# Patient Record
Sex: FEMALE | Race: OTHER | Employment: FULL TIME | URBAN - METROPOLITAN AREA
[De-identification: names, ages, dates, MRNs, and addresses within clinical notes are randomized per-mention and may not be internally consistent; named-entity substitution may affect disease eponyms.]

---

## 2016-08-30 LAB
CHLAMYDIA, EXTERNAL: NEGATIVE
N. GONORRHEA, EXTERNAL: NEGATIVE

## 2016-09-20 LAB
ANTIBODY SCREEN, EXTERNAL: NEGATIVE
HBSAG, EXTERNAL: NEGATIVE
HIV, EXTERNAL: NEGATIVE
RPR, EXTERNAL: NONREACTIVE
RUBELLA, EXTERNAL: NORMAL
TYPE, ABO & RH, EXTERNAL: NORMAL

## 2017-02-28 LAB — GRBS, EXTERNAL: NEGATIVE

## 2017-03-23 ENCOUNTER — HOSPITAL ENCOUNTER (EMERGENCY)
Age: 32
Discharge: HOME | End: 2017-03-23
Attending: OBSTETRICS & GYNECOLOGY | Admitting: OBSTETRICS & GYNECOLOGY
Payer: COMMERCIAL

## 2017-03-23 VITALS
TEMPERATURE: 97.2 F | WEIGHT: 198 LBS | HEIGHT: 69 IN | HEART RATE: 81 BPM | BODY MASS INDEX: 29.33 KG/M2 | SYSTOLIC BLOOD PRESSURE: 126 MMHG | DIASTOLIC BLOOD PRESSURE: 74 MMHG | OXYGEN SATURATION: 89 % | RESPIRATION RATE: 16 BRPM

## 2017-03-23 PROCEDURE — 75810000275 HC EMERGENCY DEPT VISIT NO LEVEL OF CARE

## 2017-03-23 PROCEDURE — 99285 EMERGENCY DEPT VISIT HI MDM: CPT | Performed by: OBSTETRICS & GYNECOLOGY

## 2017-03-23 PROCEDURE — 59025 FETAL NON-STRESS TEST: CPT | Performed by: OBSTETRICS & GYNECOLOGY

## 2017-03-23 RX ORDER — VALACYCLOVIR HYDROCHLORIDE 500 MG/1
TABLET, FILM COATED ORAL 2 TIMES DAILY
Status: ON HOLD | COMMUNITY
End: 2019-08-04

## 2017-03-23 RX ORDER — FENTANYL/BUPIVACAINE/NS/PF 2-1250MCG
1-16 PREFILLED PUMP RESERVOIR EPIDURAL CONTINUOUS
Status: DISCONTINUED | OUTPATIENT
Start: 2017-03-23 | End: 2017-03-23 | Stop reason: HOSPADM

## 2017-03-23 RX ORDER — DOCUSATE SODIUM 100 MG/1
100 CAPSULE, LIQUID FILLED ORAL 2 TIMES DAILY
COMMUNITY
End: 2022-04-05

## 2017-03-23 NOTE — IP AVS SNAPSHOT
Shanice Newsome 
 
 
 [address] 
[phone] Patient: Tootie Deleon MRN: [id number]  You are allergic to the following Allergen Reactions Shellfish Derived Anaphylaxis Recent Documentation Height Weight BMI OB Status Smoking Status 1.753 m 89.8 kg 29.24 kg/m2 Pregnant Never Smoker Emergency Contacts Name Discharge Info Relation Home Work Mobile [employer]  Life Partner [7] [phone] About your hospitalization You were admitted on:  N/A You last received care in the:  [facility] LABOR & DELIVERY You were discharged on:  2017 Unit phone number:  [phone] Why you were hospitalized Your primary diagnosis was:  Not on File Providers Seen During Your Hospitalizations Provider Role Specialty Primary office phone Carmelo Terrell MD Attending Provider Obstetrics & Gynecology [phone] Your Primary Care Physician (PCP) Primary Care Physician Office Phone Office Fax NONE ** None ** ** None ** Follow-up Information Follow up With Details Comments Contact Info None   None ([phone]) Patient stated that they have no PCP Current Discharge Medication List  
  
ASK your doctor about these medications Dose & Instructions Dispensing Information Comments Morning Noon Evening Bedtime COLACE 100 mg capsule Generic drug:  docusate sodium Your last dose was: Your next dose is:    
   
   
 Dose:  100 mg Take 100 mg by mouth two (2) times a day. Refills:  0 HYDROcodone-acetaminophen 5-325 mg per tablet Commonly known as:  1463 Franches[address] Parker Your last dose was: Your next dose is:    
   
   
 Dose:  1 Tab Take 1 tablet by mouth every four (4) hours as needed. Quantity:  15 tablet Refills:  0  
     
   
   
   
  
 ibuprofen 800 mg tablet Commonly known as:  MOTRIN Your last dose was: Your next dose is:    
   
   
 Dose:  800 mg Take 1 tablet by mouth every eight (8) hours. Quantity:  30 tablet Refills:  0 VALTREX 500 mg tablet Generic drug:  valACYclovir Your last dose was: Your next dose is: Take  by mouth two (2) times a day. Indications: GENITAL HERPES SIMPLEX, suppression started at 35 weeks Refills:  0 Discharge Instructions Pregnancy Precautions: Care Instructions Your Care Instructions There is no sure way to prevent labor before your due date ( labor) or to prevent most other pregnancy problems. But there are things you can do to increase your chances of a healthy pregnancy. Go to your appointments, follow your doctor's advice, and take good care of yourself. Eat well, and exercise (if your doctor agrees). And make sure to drink plenty of water. Follow-up care is a key part of your treatment and safety. Be sure to make and go to all appointments, and call your doctor if you are having problems. It's also a good idea to know your test results and keep a list of the medicines you take. How can you care for yourself at home? · Make sure you go to your prenatal appointments. At each visit, your doctor will check your blood pressure. Your doctor will also check to see if you have protein in your urine. High blood pressure and protein in urine are signs of preeclampsia. This condition can be dangerous for you and your baby. · Drink plenty of fluids, enough so that your urine is light yellow or clear like water. Dehydration can cause contractions. If you have kidney, heart, or liver disease and have to limit fluids, talk with your doctor before you increase the amount of fluids you drink. · Tell your doctor right away if you notice any symptoms of an infection, such as: ¨ Burning when you urinate. ¨ A foul-smelling discharge from your vagina. ¨ Vaginal itching. ¨ Unexplained fever. ¨ Unusual pain or soreness in your uterus or lower belly. · Eat a balanced diet. Include plenty of foods that are high in calcium and iron. ¨ Foods high in calcium include milk, cheese, yogurt, almonds, and broccoli. ¨ Foods high in iron include red meat, shellfish, poultry, eggs, beans, raisins, whole-grain bread, and leafy green vegetables. · Do not smoke. If you need help quitting, talk to your doctor about stop-smoking programs and medicines. These can increase your chances of quitting for good. · Do not drink alcohol or use illegal drugs. · Follow your doctor's directions about activity. Your doctor will let you know how much, if any, exercise you can do. · Ask your doctor if you can have sex. If you are at risk for early labor, your doctor may ask you to not have sex. · Take care to prevent falls. During pregnancy, your joints are loose, and your balance is off. Sports such as bicycling, skiing, or in-line skating can increase your risk of falling. And don't ride horses or motorcycles, dive, water ski, scuba dive, or parachute jump while you are pregnant. · Avoid getting very hot. Do not use saunas or hot tubs. Avoid staying out in the sun in hot weather for long periods. Take acetaminophen (Tylenol) to lower a high fever. · Do not take any over-the-counter or herbal medicines or supplements without talking to your doctor or pharmacist first. 
When should you call for help? Call 911 anytime you think you may need emergency care. For example, call if: 
· You passed out (lost consciousness). · You have severe vaginal bleeding. · You have severe pain in your belly or pelvis. · You have had fluid gushing or leaking from your vagina and you know or think the umbilical cord is bulging into your vagina.  If this happens, immediately get down on your knees so your rear end (buttocks) is higher than your head. This will decrease the pressure on the cord until help arrives. Call your doctor now or seek immediate medical care if: 
· You have signs of preeclampsia, such as: 
¨ Sudden swelling of your face, hands, or feet. ¨ New vision problems (such as dimness or blurring). ¨ A severe headache. · You have any vaginal bleeding. · You have belly pain or cramping. · You have a fever. · You have had regular contractions (with or without pain) for an hour. This means that you have 8 or more within 1 hour or 4 or more in 20 minutes after you change your position and drink fluids. · You have a sudden release of fluid from your vagina. · You have low back pain or pelvic pressure that does not go away. · You notice that your baby has stopped moving or is moving much less than normal. 
Watch closely for changes in your health, and be sure to contact your doctor if you have any problems. Where can you learn more? Go to http://lulu-vicente.info/. Enter [id number] in the search box to learn more about \"Pregnancy Precautions: Care Instructions. \" Current as of: May 30, 2016 Content Version: 11.1 © 1955-6054 [employer]. Care instructions adapted under license by [employer] (which disclaims liability or warranty for this information). If you have questions about a medical condition or this instruction, always ask your healthcare professional. [address] any warranty or liability for your use of this information. Counting Your Baby's Kicks: Care Instructions Your Care Instructions Counting your baby's kicks is one way your doctor can tell that your baby is healthy. Most womenespecially in a first pregnancyfeel their baby move for the first time between 16 and 22 weeks. The movement may feel like flutters rather than kicks.  Your baby may move more at certain times of the day. When you are active, you may notice less kicking than when you are resting. At your prenatal visits, your doctor will ask whether the baby is active. In your last trimester, your doctor may ask you to count the number of times you feel your baby move. Follow-up care is a key part of your treatment and safety. Be sure to make and go to all appointments, and call your doctor if you are having problems. It's also a good idea to know your test results and keep a list of the medicines you take. How do you count fetal kicks? · A common method of checking your baby's movement is to count the number of kicks or moves you feel in 1 hour. Ten movements (such as kicks, flutters, or rolls) in 1 hour are normal. Some doctors suggest that you count in the morning until you get to 10 movements. Then you can quit for that day and start again the next day. · Pick your baby's most active time of day to count. This may be any time from morning to evening. · If you do not feel 10 movements in an hour, your baby may be sleeping. Wait for the next hour and count again. When should you call for help? Call your doctor now or seek immediate medical care if: 
· You noticed that your baby has stopped moving or is moving much less than normal. 
Watch closely for changes in your health, and be sure to contact your doctor if you have any problems. Where can you learn more? Go to http://lulu-vicente.info/. Enter Q283 in the search box to learn more about \"Counting Your Baby's Kicks: Care Instructions. \" Current as of: May 30, 2016 Content Version: 11.1 © 4539-0518 [employer]. Care instructions adapted under license by [employer] (which disclaims liability or warranty for this information).  If you have questions about a medical condition or this instruction, always ask your healthcare professional. [address] any warranty or liability for your use of this information. Ashia Ismael Contractions: Care Instructions Your Care Instructions [place] Alanis contractions prepare your uterus for labor. Think of them as a \"warm-up\" exercise that your body does. You may begin to feel them between the 28th and 30th weeks of your pregnancy. But they start as early as the 20th week. Hipolito Alanis contractions usually occur more often during the ninth month. They may go away when you are active and return when you rest. These contractions are like mild contractions of true labor, but they occur less often. (You feel fewer than 8 in an hour.) They don't cause your cervix to open. It may be hard for you to tell the difference between Ashia Ismael contractions and true labor, especially in your first pregnancy. Follow-up care is a key part of your treatment and safety. Be sure to make and go to all appointments, and call your doctor if you are having problems. It's also a good idea to know your test results and keep a list of the medicines you take. How can you care for yourself at home? · Try a warm bath to help relieve muscle tension and reduce pain. · Change positions every 30 minutes. Take breaks if you must sit for a long time. Get up and walk around. · Drink plenty of water, enough so that your urine is light yellow or clear like water. · Taking short walks may help you feel better. Your doctor needs to check any contractions that are getting stronger or closer together. Where can you learn more? Go to http://lulu-vicente.info/. Enter [id number] in the search box to learn more about \"[place] Alanis Contractions: Care Instructions. \" Current as of: May 30, 2016 Content Version: 11.1 © 6956-6327 [employer]. Care instructions adapted under license by [employer] (which disclaims liability or warranty for this information).  If you have questions about a medical condition or this instruction, always ask your healthcare professional. [address] any warranty or liability for your use of this information. Follow up with Dr Mychal El tomorrow 3/24. The office will call to schedule this appointment with you. If undelivered, return on 3/27 at [facility] for elective induction. Discharge Orders None Introducing [facility]! [employer] introduces Cor[employer] patient portal. Now you can access parts of your medical record, email your doctor's office, and request medication refills online. 1. In your internet browser, go to [email or web address] 2. Click on the First Time User? Click Here link in the Sign In box. You will see the New Member Sign Up page. 3. Enter your Coridea Access Code exactly as it appears below. You will not need to use this code after youve completed the sign-up process. If you do not sign up before the expiration date, you must request a new code. · Coridea Access Code: [email or web address] Expires: 6/21/2017  9:38 AM 
 
4. Enter the last four digits of your Social Security Number (xxxx) and Date of Birth (mm/dd/yyyy) as indicated and click Submit. You will be taken to the next sign-up page. 5. Create a Coridea ID. This will be your Coridea login ID and cannot be changed, so think of one that is secure and easy to remember. 6. Create a Coridea password. You can change your password at any time. 7. Enter your Password Reset Question and Answer. This can be used at a later time if you forget your password. 8. Enter your e-mail address. You will receive e-mail notification when new information is available in [address] 9. Click Sign Up. You can now view and download portions of your medical record. 10. Click the Download Summary menu link to download a portable copy of your medical information.  
 
If you have questions, please visit the Frequently Asked Questions section of the [email or web address]. Remember, MyChart is NOT to be used for urgent needs. For medical emergencies, dial 911. Now available from your iPhone and Android! General Information Please provide this summary of care documentation to your next provider. Patient Signature:  ____________________________________________________________ Date:  ____________________________________________________________  
  
Sharad Mais Provider Signature:  ____________________________________________________________ Date:  ____________________________________________________________

## 2017-03-23 NOTE — PROGRESS NOTES
arrive to L & D c/o contractions on & off, every 3-5 minutes, denies leaking fluid or bleeding, does report fetal movement, does report having intercourse last night but does report contractions started prior to that, denies any issues this pregnancy, does take valtrex for suppression but has reported no recent outbreaks  0122- PO fluids given  0136- Dr. Lani Bess notified of pt's arrival & status, plan is to recheck in 2 hours, sooner if pain worsens  0250- EFM adjusted, audible 130's   0257- EFM adjusted, audible 130's  0340-Dr. Meredith updated on pt's pain & SVE, will let patient choose if would like to have pain medicine & be discharged or stay longer to be recheck  0345- Pt declines pain medication at this time, would like to be rechecked in a little while  0349- External EFM changed  0512- At bed, FHR [id number],EFM adjusted  4800- Discussed plan of care & fetal tracing with Dr. Lani Bess, pt will stay until someone from the group rounds on her  0700- report given to NICOL Kovacs

## 2017-03-23 NOTE — IP AVS SNAPSHOT
Summary of Care Report The Summary of Care report has been created to help improve care coordination. Users with access to [employer] or [address] (Web-based application) may access additional patient information including the Discharge Summary. If you are not currently a [address] user and need more information, please call the number listed below in the [place] [address] section and ask to be connected with Medical Records. Facility Information Name Address Phone [address] [place] [id number] [phone] Patient Information Patient Name Sex  [facility], April ([id number]) Female 1985 Discharge Information Admitting Provider Service Area Unit Aniket Herrmann MD / [phone] [facility] 2 Labor & Delivery / [phone] Discharge Provider Discharge Date/Time Discharge Disposition Destination (none) 3/23/2017 (Pending) AHR (none) Patient Language Language ENGLISH [13] Non-Hospital Problems as of 3/23/2017  Never Reviewed Class Noted - Resolved Last Modified Active Problems Pregnancy  2014 - Present 2014 Entered by Rosalba Mandel MD  
  
You are allergic to the following Allergen Reactions Shellfish Derived Anaphylaxis Current Discharge Medication List  
  
ASK your doctor about these medications Dose & Instructions Dispensing Information Comments COLACE 100 mg capsule Generic drug:  docusate sodium Dose:  100 mg Take 100 mg by mouth two (2) times a day. Refills:  0 HYDROcodone-acetaminophen 5-325 mg per tablet Commonly known as:  Muriel Quintin Dose:  1 Tab Take 1 tablet by mouth every four (4) hours as needed. Quantity:  15 tablet Refills:  0  
   
 ibuprofen 800 mg tablet Commonly known as:  MOTRIN  
 Dose:  800 mg Take 1 tablet by mouth every eight (8) hours. Quantity:  30 tablet Refills:  0 VALTREX 500 mg tablet Generic drug:  valACYclovir Take  by mouth two (2) times a day. Indications: GENITAL HERPES SIMPLEX, suppression started at 35 weeks Refills:  0 Current Immunizations Name Date Tdap 2014 Follow-up Information Follow up With Details Comments Contact Info None   None (395) Patient stated that they have no PCP Discharge Instructions Pregnancy Precautions: Care Instructions Your Care Instructions There is no sure way to prevent labor before your due date ( labor) or to prevent most other pregnancy problems. But there are things you can do to increase your chances of a healthy pregnancy. Go to your appointments, follow your doctor's advice, and take good care of yourself. Eat well, and exercise (if your doctor agrees). And make sure to drink plenty of water. Follow-up care is a key part of your treatment and safety. Be sure to make and go to all appointments, and call your doctor if you are having problems. It's also a good idea to know your test results and keep a list of the medicines you take. How can you care for yourself at home? · Make sure you go to your prenatal appointments. At each visit, your doctor will check your blood pressure. Your doctor will also check to see if you have protein in your urine. High blood pressure and protein in urine are signs of preeclampsia. This condition can be dangerous for you and your baby. · Drink plenty of fluids, enough so that your urine is light yellow or clear like water. Dehydration can cause contractions. If you have kidney, heart, or liver disease and have to limit fluids, talk with your doctor before you increase the amount of fluids you drink. · Tell your doctor right away if you notice any symptoms of an infection, such as: ¨ Burning when you urinate. ¨ A foul-smelling discharge from your vagina. ¨ Vaginal itching. ¨ Unexplained fever. ¨ Unusual pain or soreness in your uterus or lower belly. · Eat a balanced diet. Include plenty of foods that are high in calcium and iron. ¨ Foods high in calcium include milk, cheese, yogurt, almonds, and broccoli. ¨ Foods high in iron include red meat, shellfish, poultry, eggs, beans, raisins, whole-grain bread, and leafy green vegetables. · Do not smoke. If you need help quitting, talk to your doctor about stop-smoking programs and medicines. These can increase your chances of quitting for good. · Do not drink alcohol or use illegal drugs. · Follow your doctor's directions about activity. Your doctor will let you know how much, if any, exercise you can do. · Ask your doctor if you can have sex. If you are at risk for early labor, your doctor may ask you to not have sex. · Take care to prevent falls. During pregnancy, your joints are loose, and your balance is off. Sports such as bicycling, skiing, or in-line skating can increase your risk of falling. And don't ride horses or motorcycles, dive, water ski, scuba dive, or parachute jump while you are pregnant. · Avoid getting very hot. Do not use saunas or hot tubs. Avoid staying out in the sun in hot weather for long periods. Take acetaminophen (Tylenol) to lower a high fever. · Do not take any over-the-counter or herbal medicines or supplements without talking to your doctor or pharmacist first. 
When should you call for help? Call 911 anytime you think you may need emergency care. For example, call if: 
· You passed out (lost consciousness). · You have severe vaginal bleeding. · You have severe pain in your belly or pelvis. · You have had fluid gushing or leaking from your vagina and you know or think the umbilical cord is bulging into your vagina.  If this happens, immediately get down on your knees so your rear end (buttocks) is higher than your head. This will decrease the pressure on the cord until help arrives. Call your doctor now or seek immediate medical care if: 
· You have signs of preeclampsia, such as: 
¨ Sudden swelling of your face, hands, or feet. ¨ New vision problems (such as dimness or blurring). ¨ A severe headache. · You have any vaginal bleeding. · You have belly pain or cramping. · You have a fever. · You have had regular contractions (with or without pain) for an hour. This means that you have 8 or more within 1 hour or 4 or more in 20 minutes after you change your position and drink fluids. · You have a sudden release of fluid from your vagina. · You have low back pain or pelvic pressure that does not go away. · You notice that your baby has stopped moving or is moving much less than normal. 
Watch closely for changes in your health, and be sure to contact your doctor if you have any problems. Where can you learn more? Go to http://lulu-vicente.info/. Enter [id number] in the search box to learn more about \"Pregnancy Precautions: Care Instructions. \" Current as of: May 30, 2016 Content Version: 11.1 © 3345-2506 UZwan. Care instructions adapted under license by [employer] (which disclaims liability or warranty for this information). If you have questions about a medical condition or this instruction, always ask your healthcare professional. [address] any warranty or liability for your use of this information. Counting Your Baby's Kicks: Care Instructions Your Care Instructions Counting your baby's kicks is one way your doctor can tell that your baby is healthy. Most womenespecially in a first pregnancyfeel their baby move for the first time between 16 and 22 weeks. The movement may feel like flutters rather than kicks.  Your baby may move more at certain times of the day. When you are active, you may notice less kicking than when you are resting. At your prenatal visits, your doctor will ask whether the baby is active. In your last trimester, your doctor may ask you to count the number of times you feel your baby move. Follow-up care is a key part of your treatment and safety. Be sure to make and go to all appointments, and call your doctor if you are having problems. It's also a good idea to know your test results and keep a list of the medicines you take. How do you count fetal kicks? · A common method of checking your baby's movement is to count the number of kicks or moves you feel in 1 hour. Ten movements (such as kicks, flutters, or rolls) in 1 hour are normal. Some doctors suggest that you count in the morning until you get to 10 movements. Then you can quit for that day and start again the next day. · Pick your baby's most active time of day to count. This may be any time from morning to evening. · If you do not feel 10 movements in an hour, your baby may be sleeping. Wait for the next hour and count again. When should you call for help? Call your doctor now or seek immediate medical care if: 
· You noticed that your baby has stopped moving or is moving much less than normal. 
Watch closely for changes in your health, and be sure to contact your doctor if you have any problems. Where can you learn more? Go to http://lulu-vicente.info/. Enter D112 in the search box to learn more about \"Counting Your Baby's Kicks: Care Instructions. \" Current as of: May 30, 2016 Content Version: 11.1 © 6687-6478 [employer]ov[employer]. Care instructions adapted under license by [employer] (which disclaims liability or warranty for this information).  If you have questions about a medical condition or this instruction, always ask your healthcare professional. [address] any warranty or liability for your use of this information. Raad Simeon Contractions: Care Instructions Your Care Instructions Hipolito Alanis contractions prepare your uterus for labor. Think of them as a \"warm-up\" exercise that your body does. You may begin to feel them between the 28th and 30th weeks of your pregnancy. But they start as early as the 20th week. [place] Alanis contractions usually occur more often during the ninth month. They may go away when you are active and return when you rest. These contractions are like mild contractions of true labor, but they occur less often. (You feel fewer than 8 in an hour.) They don't cause your cervix to open. It may be hard for you to tell the difference between Raad Leonia contractions and true labor, especially in your first pregnancy. Follow-up care is a key part of your treatment and safety. Be sure to make and go to all appointments, and call your doctor if you are having problems. It's also a good idea to know your test results and keep a list of the medicines you take. How can you care for yourself at home? · Try a warm bath to help relieve muscle tension and reduce pain. · Change positions every 30 minutes. Take breaks if you must sit for a long time. Get up and walk around. · Drink plenty of water, enough so that your urine is light yellow or clear like water. · Taking short walks may help you feel better. Your doctor needs to check any contractions that are getting stronger or closer together. Where can you learn more? Go to http://lulu-vicente.info/. Enter [id number] in the search box to learn more about \"[place] Alanis Contractions: Care Instructions. \" Current as of: May 30, 2016 Content Version: 11.1 © 0924-3773 Workiva. Care instructions adapted under license by [employer] (which disclaims liability or warranty for this information).  If you have questions about a medical condition or this instruction, always ask your healthcare professional. [place] [address] any warranty or liability for your use of this information. Follow up with Dr Katelin Khan tomorrow 3/24. The office will call to schedule this appointment with you. If undelivered, return on 3/27 at [facility] for elective induction. Chart Review Routing History Recipient Method Report Sent By Ny Dejesus None [address] Mail IP Auto Routed Notes Mariella Hawkins MD [[id number]] 8/20/2014  9:11 AM 08/20/2014 None [address] IP Auto Routed Notes Mariella Hawkins MD [[id number]] 8/20/2014  9:18 PM 08/20/2014 None [address] Mail IP Auto Routed Notes Mariella Hawkins MD [[id number]] 8/22/2014  8:52 AM 08/22/2014

## 2017-03-23 NOTE — PROGRESS NOTES
3/23/2017 0703  Bedside and Verbal shift change report given to Shemar Desir RNC (oncoming nurse) by Sayda Meléndez RN (offgoing nurse). Report included the following information SBAR, Intake/Output, MAR, Recent Results and Med Rec Status. 3/23/2017 7586  Patient placed on wireless fetal monitor. Educating patient on the need for continuous fetal monitoring at this time due to intermittent tracing in the 60 bpm. Patient states she can audibly hear the drop in heart rate. Patient and SO agree to POC. Consents signed. 3/23/2017 7:44 AM  Patient ambulating in hallway with SO, gait steady. 3/23/2017 0820  Patient returned to bed. States her contractions are about the same as before. 3/23/2017 8:33 AM  Patient taken off of wireless EFM and placed back on monitor. Pulse ox applied. 3/23/2017 8:49 AM  Spoke with Dr. Marina Gooden regarding patient contraction pattern, FHR, and cervical exam during the night. Per MD, recheck patient's cervix. He will review the strip and come up with POC. Cervical exam 2-3/thick/-3. Educated patient on the possibility of going home due to minimal cervical change. Patient and SO agree to POC.     3/23/2017 9:34 AM  Dr Marina Gooden at bedside discussing [address]. Patient wishes to go home and follow up tomorrow. Patient elective induction scheduled for Monday March 27. Went over discharge instructions and follow up appointments; patient verbalizes understanding. 3/23/2017 9:58 AM  Patient left in wheelchair with SO and son. Stable condition noted.

## 2017-03-23 NOTE — DISCHARGE INSTRUCTIONS
Pregnancy Precautions: Care Instructions  Your Care Instructions  There is no sure way to prevent labor before your due date ( labor) or to prevent most other pregnancy problems. But there are things you can do to increase your chances of a healthy pregnancy. Go to your appointments, follow your doctor's advice, and take good care of yourself. Eat well, and exercise (if your doctor agrees). And make sure to drink plenty of water. Follow-up care is a key part of your treatment and safety. Be sure to make and go to all appointments, and call your doctor if you are having problems. It's also a good idea to know your test results and keep a list of the medicines you take. How can you care for yourself at home? · Make sure you go to your prenatal appointments. At each visit, your doctor will check your blood pressure. Your doctor will also check to see if you have protein in your urine. High blood pressure and protein in urine are signs of preeclampsia. This condition can be dangerous for you and your baby. · Drink plenty of fluids, enough so that your urine is light yellow or clear like water. Dehydration can cause contractions. If you have kidney, heart, or liver disease and have to limit fluids, talk with your doctor before you increase the amount of fluids you drink. · Tell your doctor right away if you notice any symptoms of an infection, such as:  ¨ Burning when you urinate. ¨ A foul-smelling discharge from your vagina. ¨ Vaginal itching. ¨ Unexplained fever. ¨ Unusual pain or soreness in your uterus or lower belly. · Eat a balanced diet. Include plenty of foods that are high in calcium and iron. ¨ Foods high in calcium include milk, cheese, yogurt, almonds, and broccoli. ¨ Foods high in iron include red meat, shellfish, poultry, eggs, beans, raisins, whole-grain bread, and leafy green vegetables. · Do not smoke.  If you need help quitting, talk to your doctor about stop-smoking programs and medicines. These can increase your chances of quitting for good. · Do not drink alcohol or use illegal drugs. · Follow your doctor's directions about activity. Your doctor will let you know how much, if any, exercise you can do. · Ask your doctor if you can have sex. If you are at risk for early labor, your doctor may ask you to not have sex. · Take care to prevent falls. During pregnancy, your joints are loose, and your balance is off. Sports such as bicycling, skiing, or in-line skating can increase your risk of falling. And don't ride horses or motorcycles, dive, water ski, scuba dive, or parachute jump while you are pregnant. · Avoid getting very hot. Do not use saunas or hot tubs. Avoid staying out in the sun in hot weather for long periods. Take acetaminophen (Tylenol) to lower a high fever. · Do not take any over-the-counter or herbal medicines or supplements without talking to your doctor or pharmacist first.  When should you call for help? Call 911 anytime you think you may need emergency care. For example, call if:  · You passed out (lost consciousness). · You have severe vaginal bleeding. · You have severe pain in your belly or pelvis. · You have had fluid gushing or leaking from your vagina and you know or think the umbilical cord is bulging into your vagina. If this happens, immediately get down on your knees so your rear end (buttocks) is higher than your head. This will decrease the pressure on the cord until help arrives. Call your doctor now or seek immediate medical care if:  · You have signs of preeclampsia, such as:  ¨ Sudden swelling of your face, hands, or feet. ¨ New vision problems (such as dimness or blurring). ¨ A severe headache. · You have any vaginal bleeding. · You have belly pain or cramping. · You have a fever. · You have had regular contractions (with or without pain) for an hour.  This means that you have 8 or more within 1 hour or 4 or more in 20 minutes after you change your position and drink fluids. · You have a sudden release of fluid from your vagina. · You have low back pain or pelvic pressure that does not go away. · You notice that your baby has stopped moving or is moving much less than normal.  Watch closely for changes in your health, and be sure to contact your doctor if you have any problems. Where can you learn more? Go to http://lulu-vicente.info/. Enter [id number] in the search box to learn more about \"Pregnancy Precautions: Care Instructions. \"  Current as of: May 30, 2016  Content Version: 11.1  © 0434-8343 Spor. Care instructions adapted under license by [employer] (which disclaims liability or warranty for this information). If you have questions about a medical condition or this instruction, always ask your healthcare professional. [address] any warranty or liability for your use of this information. Counting Your Baby's Kicks: Care Instructions  Your Care Instructions  Counting your baby's kicks is one way your doctor can tell that your baby is healthy. Most women--especially in a first pregnancy--feel their baby move for the first time between 16 and 22 weeks. The movement may feel like flutters rather than kicks. Your baby may move more at certain times of the day. When you are active, you may notice less kicking than when you are resting. At your prenatal visits, your doctor will ask whether the baby is active. In your last trimester, your doctor may ask you to count the number of times you feel your baby move. Follow-up care is a key part of your treatment and safety. Be sure to make and go to all appointments, and call your doctor if you are having problems. It's also a good idea to know your test results and keep a list of the medicines you take. How do you count fetal kicks?   · A common method of checking your baby's movement is to count the number of kicks or moves you feel in 1 hour. Ten movements (such as kicks, flutters, or rolls) in 1 hour are normal. Some doctors suggest that you count in the morning until you get to 10 movements. Then you can quit for that day and start again the next day. · Pick your baby's most active time of day to count. This may be any time from morning to evening. · If you do not feel 10 movements in an hour, your baby may be sleeping. Wait for the next hour and count again. When should you call for help? Call your doctor now or seek immediate medical care if:  · You noticed that your baby has stopped moving or is moving much less than normal.  Watch closely for changes in your health, and be sure to contact your doctor if you have any problems. Where can you learn more? Go to http://lulu-vicente.info/. Enter G591 in the search box to learn more about \"Counting Your Baby's Kicks: Care Instructions. \"  Current as of: May 30, 2016  Content Version: 11.1  © 6823-8234 [employer]. Care instructions adapted under license by [employer] (which disclaims liability or warranty for this information). If you have questions about a medical condition or this instruction, always ask your healthcare professional. [address] any warranty or liability for your use of this information. Alisa Castelan Contractions: Care Instructions  Your Care Instructions  [place] Alanis contractions prepare your uterus for labor. Think of them as a \"warm-up\" exercise that your body does. You may begin to feel them between the 28th and 30th weeks of your pregnancy. But they start as early as the 20th week. Hipolito Alansi contractions usually occur more often during the ninth month. They may go away when you are active and return when you rest. These contractions are like mild contractions of true labor, but they occur less often.  (You feel fewer than 8 in an hour.) They don't cause your cervix to open.  It may be hard for you to tell the difference between Francisca Evener contractions and true labor, especially in your first pregnancy. Follow-up care is a key part of your treatment and safety. Be sure to make and go to all appointments, and call your doctor if you are having problems. It's also a good idea to know your test results and keep a list of the medicines you take. How can you care for yourself at home? · Try a warm bath to help relieve muscle tension and reduce pain. · Change positions every 30 minutes. Take breaks if you must sit for a long time. Get up and walk around. · Drink plenty of water, enough so that your urine is light yellow or clear like water. · Taking short walks may help you feel better. Your doctor needs to check any contractions that are getting stronger or closer together. Where can you learn more? Go to http://lulu-vicente.info/. Enter [id number] in the search box to learn more about \"[place] Alanis Contractions: Care Instructions. \"  Current as of: May 30, 2016  Content Version: 11.1  © 5104-6351 [employer]. Care instructions adapted under license by [employer] (which disclaims liability or warranty for this information). If you have questions about a medical condition or this instruction, always ask your healthcare professional. [address] any warranty or liability for your use of this information. Follow up with Dr Chavez Heller tomorrow 3/24. The office will call to schedule this appointment with you. If undelivered, return on 3/27 at [facility] for elective induction.

## 2017-03-25 ENCOUNTER — HOSPITAL ENCOUNTER (INPATIENT)
Age: 32
LOS: 3 days | Discharge: HOME | End: 2017-03-28
Attending: OBSTETRICS & GYNECOLOGY | Admitting: OBSTETRICS & GYNECOLOGY
Payer: COMMERCIAL

## 2017-03-25 PROBLEM — Z37.9 NORMAL LABOR: Status: ACTIVE | Noted: 2017-03-25

## 2017-03-25 LAB
ERYTHROCYTE [DISTWIDTH] IN BLOOD BY AUTOMATED COUNT: 14.2 % (ref 11.5–14.5)
HCT VFR BLD AUTO: 31.9 % (ref 35–47)
HGB BLD-MCNC: 10.2 G/DL (ref 11.5–16)
MCH RBC QN AUTO: 25.7 PG (ref 26–34)
MCHC RBC AUTO-ENTMCNC: 32 G/DL (ref 30–36.5)
MCV RBC AUTO: 80.4 FL (ref 80–99)
PLATELET # BLD AUTO: 208 K/UL (ref 150–400)
RBC # BLD AUTO: 3.97 M/UL (ref 3.8–5.2)
WBC # BLD AUTO: 10.9 K/UL (ref 3.6–11)

## 2017-03-25 PROCEDURE — 75410000002 HC LABOR FEE PER 1 HR: Performed by: OBSTETRICS & GYNECOLOGY

## 2017-03-25 PROCEDURE — 85027 COMPLETE CBC AUTOMATED: CPT | Performed by: OBSTETRICS & GYNECOLOGY

## 2017-03-25 PROCEDURE — 76060000078 HC EPIDURAL ANESTHESIA: Performed by: ANESTHESIOLOGY

## 2017-03-25 PROCEDURE — 75410000000 HC DELIVERY VAGINAL/SINGLE: Performed by: OBSTETRICS & GYNECOLOGY

## 2017-03-25 PROCEDURE — 75410000003 HC RECOV DEL/VAG/CSECN EA 0.5 HR: Performed by: OBSTETRICS & GYNECOLOGY

## 2017-03-25 PROCEDURE — 74011250636 HC RX REV CODE- 250/636: Performed by: OBSTETRICS & GYNECOLOGY

## 2017-03-25 PROCEDURE — 65270000029 HC RM PRIVATE

## 2017-03-25 PROCEDURE — 10907ZC DRAINAGE OF AMNIOTIC FLUID, THERAPEUTIC FROM PRODUCTS OF CONCEPTION, VIA NATURAL OR ARTIFICIAL OPENING: ICD-10-PCS | Performed by: OBSTETRICS & GYNECOLOGY

## 2017-03-25 PROCEDURE — 36415 COLL VENOUS BLD VENIPUNCTURE: CPT | Performed by: OBSTETRICS & GYNECOLOGY

## 2017-03-25 RX ORDER — NALOXONE HYDROCHLORIDE 0.4 MG/ML
0.4 INJECTION, SOLUTION INTRAMUSCULAR; INTRAVENOUS; SUBCUTANEOUS AS NEEDED
Status: DISCONTINUED | OUTPATIENT
Start: 2017-03-25 | End: 2017-03-26

## 2017-03-25 RX ORDER — OXYTOCIN IN 5 % DEXTROSE 30/500 ML
0.5 PLASTIC BAG, INJECTION (ML) INTRAVENOUS
Status: DISCONTINUED | OUTPATIENT
Start: 2017-03-25 | End: 2017-03-26

## 2017-03-25 RX ORDER — ACETAMINOPHEN 325 MG/1
650 TABLET ORAL
Status: DISCONTINUED | OUTPATIENT
Start: 2017-03-25 | End: 2017-03-26

## 2017-03-25 RX ORDER — MAG HYDROX/ALUMINUM HYD/SIMETH 200-200-20
30 SUSPENSION, ORAL (FINAL DOSE FORM) ORAL
Status: DISCONTINUED | OUTPATIENT
Start: 2017-03-25 | End: 2017-03-26

## 2017-03-25 RX ORDER — SODIUM CHLORIDE 0.9 % (FLUSH) 0.9 %
5-10 SYRINGE (ML) INJECTION AS NEEDED
Status: DISCONTINUED | OUTPATIENT
Start: 2017-03-25 | End: 2017-03-26

## 2017-03-25 RX ORDER — SODIUM CHLORIDE 0.9 % (FLUSH) 0.9 %
5-10 SYRINGE (ML) INJECTION EVERY 8 HOURS
Status: DISCONTINUED | OUTPATIENT
Start: 2017-03-25 | End: 2017-03-26

## 2017-03-25 RX ORDER — SODIUM CHLORIDE, SODIUM LACTATE, POTASSIUM CHLORIDE, CALCIUM CHLORIDE 600; 310; 30; 20 MG/100ML; MG/100ML; MG/100ML; MG/100ML
125 INJECTION, SOLUTION INTRAVENOUS CONTINUOUS
Status: DISCONTINUED | OUTPATIENT
Start: 2017-03-25 | End: 2017-03-26

## 2017-03-25 RX ORDER — ONDANSETRON 2 MG/ML
4 INJECTION INTRAMUSCULAR; INTRAVENOUS
Status: DISCONTINUED | OUTPATIENT
Start: 2017-03-25 | End: 2017-03-26

## 2017-03-25 RX ORDER — BUTORPHANOL TARTRATE 1 MG/ML
1 INJECTION INTRAMUSCULAR; INTRAVENOUS
Status: DISCONTINUED | OUTPATIENT
Start: 2017-03-25 | End: 2017-03-26

## 2017-03-25 RX ADMIN — BUTORPHANOL TARTRATE 1 MG: 1 INJECTION, SOLUTION INTRAMUSCULAR; INTRAVENOUS at 23:30

## 2017-03-25 RX ADMIN — SODIUM CHLORIDE, SODIUM LACTATE, POTASSIUM CHLORIDE, AND CALCIUM CHLORIDE 125 ML/HR: 600; 310; 30; 20 INJECTION, SOLUTION INTRAVENOUS at 21:55

## 2017-03-25 NOTE — IP AVS SNAPSHOT
Current Discharge Medication List  
  
CONTINUE these medications which have CHANGED Dose & Instructions Dispensing Information Comments Morning Noon Evening Bedtime  
 ibuprofen 600 mg tablet Commonly known as:  MOTRIN What changed:   
- medication strength 
- how much to take - when to take this 
- reasons to take this Your last dose was: Your next dose is:    
   
   
 Dose:  600 mg Take 1 Tab by mouth every six (6) hours as needed for Pain. Quantity:  30 Tab Refills:  0 ASK your doctor about these medications Dose & Instructions Dispensing Information Comments Morning Noon Evening Bedtime COLACE 100 mg capsule Generic drug:  docusate sodium Your last dose was: Your next dose is:    
   
   
 Dose:  100 mg Take 100 mg by mouth two (2) times a day. Refills:  0 HYDROcodone-acetaminophen 5-325 mg per tablet Commonly known as:  Muriel Quintin Your last dose was: Your next dose is:    
   
   
 Dose:  1 Tab Take 1 tablet by mouth every four (4) hours as needed. Quantity:  15 tablet Refills:  0 VALTREX 500 mg tablet Generic drug:  valACYclovir Your last dose was: Your next dose is: Take  by mouth two (2) times a day. Indications: GENITAL HERPES SIMPLEX, suppression started at 35 weeks Refills:  0  
     
   
   
   
  
 ZANTAC 150 mg tablet Generic drug:  raNITIdine Your last dose was: Your next dose is:    
   
   
 Dose:  150 mg Take 150 mg by mouth two (2) times a day. Refills:  0 Where to Get Your Medications Information on where to get these meds will be given to you by the nurse or doctor. ! Ask your nurse or doctor about these medications  
  ibuprofen 600 mg tablet

## 2017-03-25 NOTE — H&P
Obstetrics H&P    Grace Stephens  [id number]  1985    Chief Complaint:  Pregnancy and Contractions    HPI: 36 y.o. female   [id number] with Estimated Date of Delivery: 3/28/17  Pregnancy has been complicated by grandmultiparity. Patient complains of mild abdominal pain and moderate contractions  for 12 hours. Patient denies vaginal bleeding  and vaginal leaking of fluid . The patient was observed overnight. No change to her cervical exam. Her CTXs have spaced. ROS:  Pertinent items are noted in HPI. OB History      Para Term  AB TAB SAB Ectopic Multiple Living    7 5 4       4        Past Medical History:   Diagnosis Date    Abnormal Pap smear     follow up normal    Abnormal Papanicolaou smear of cervix     Asthma     uses inhaler PRN     History reviewed. No pertinent surgical history. Social History     Social History    Marital status: SINGLE     Spouse name: N/A    Number of children: N/A    Years of education: N/A     Occupational History    Not on file. Social History Main Topics    Smoking status: Never Smoker    Smokeless tobacco: Not on file    Alcohol use No    Drug use: No    Sexual activity: Yes     Partners: Male     Birth control/ protection: None     Other Topics Concern    Not on file     Social History Narrative     History reviewed. No pertinent family history. Allergies   Allergen Reactions    Shellfish Derived Anaphylaxis     Prior to Admission Medications   Prescriptions Last Dose Informant Patient Reported? Taking?   docusate sodium (COLACE) 100 mg capsule 3/22/2017 at Unknown time  Yes Yes   Sig: Take 100 mg by mouth two (2) times a day. hydrocodone-acetaminophen (NORCO) 5-325 mg per tablet Not Taking at Unknown time  No No   Sig: Take 1 tablet by mouth every four (4) hours as needed. ibuprofen (MOTRIN) 800 mg tablet Not Taking at Unknown time  No No   Sig: Take 1 tablet by mouth every eight (8) hours.    valACYclovir (VALTREX) 500 mg tablet 3/22/2017 at Unknown time  Yes Yes   Sig: Take  by mouth two (2) times a day. Indications: GENITAL HERPES SIMPLEX, suppression started at 35 weeks      Facility-Administered Medications: None         Vitals:  No data found. No data recorded. I&O:                    Exam:  Patient without distress. Abdomen soft, non-tender               Fundus soft and non tender               Perineum No sign of blood or amniotic fluid               Lower extremities edema No               Cervical Exam:  2 cm dilated    50% effaced    -3 station    Membranes:   Intact    Fetal Heart Rate: Baseline: 150 per minute  Variability: moderate  Accelerations: yes  Decelerations: none  Uterine Activity: Rare         Labs: No results found for this or any previous visit (from the past 24 hour(s)). Assessment and Plan:      1. Reassuring FHT. Patient not in labor. Discharge home.

## 2017-03-25 NOTE — IP AVS SNAPSHOT
Summary of Care Report The Summary of Care report has been created to help improve care coordination. Users with access to [email or web address] or [address] (Web-based application) may access additional patient information including the Discharge Summary. If you are not currently a [address] user and need more information, please call the number listed below in the [place] [address] section and ask to be connected with Medical Records. Facility Information Name Address Phone [address] [place] [id number] [phone] Patient Information Patient Name Sex  [facility] Medico, April ([id number]) Female 1985 Discharge Information Admitting Provider Service Area Unit Ronaldo Mccauley MD / [phone] [place] 3 Mother Infant / [phone] Discharge Provider Discharge Date/Time Discharge Disposition Destination (none) 3/28/2017 (Pending) AHR (none) Patient Language Language ENGLISH [13] Hospital Problems as of 3/28/2017  Never Reviewed Class Noted - Resolved Last Modified POA Active Problems Normal labor  3/25/2017 - Present 3/25/2017 by Ronaldo Mccauley MD Unknown Entered by Ronaldo Mccauley MD  
  
Non-Hospital Problems as of 3/28/2017  Never Reviewed Class Noted - Resolved Last Modified Active Problems Pregnancy  2014 - Present 2014 Entered by Ronaldo Mccauley MD  
  
You are allergic to the following Allergen Reactions Shellfish Derived Anaphylaxis Current Discharge Medication List  
  
CONTINUE these medications which have CHANGED Dose & Instructions Dispensing Information Comments  
 ibuprofen 600 mg tablet Commonly known as:  MOTRIN What changed:   
- medication strength 
- how much to take - when to take this - reasons to take this Dose:  600 mg Take 1 Tab by mouth every six (6) hours as needed for Pain. Quantity:  30 Tab Refills:  0 ASK your doctor about these medications Dose & Instructions Dispensing Information Comments COLACE 100 mg capsule Generic drug:  docusate sodium Dose:  100 mg Take 100 mg by mouth two (2) times a day. Refills:  0 HYDROcodone-acetaminophen 5-325 mg per tablet Commonly known as:  Latanya Santamaria Dose:  1 Tab Take 1 tablet by mouth every four (4) hours as needed. Quantity:  15 tablet Refills:  0 VALTREX 500 mg tablet Generic drug:  valACYclovir Take  by mouth two (2) times a day. Indications: GENITAL HERPES SIMPLEX, suppression started at 35 weeks Refills:  0  
   
 ZANTAC 150 mg tablet Generic drug:  raNITIdine Dose:  150 mg Take 150 mg by mouth two (2) times a day. Refills:  0 Current Immunizations Name Date Tdap 8/22/2014 Surgery Information ID Date/Time Status Primary Surgeon All Procedures Location [id number] 3/26/2017 Complete   [facility] [place] DO NOT [place]EDULE Follow-up Information Follow up With Details Comments Contact Info None   None (395) Patient stated that they have no PCP Discharge Instructions POST DELIVERY DISCHARGE INSTRUCTIONS Name: Allen Corona YOB: 1985 Primary Diagnosis: Active Problems: 
  Normal labor (3/25/2017) General:  
 
Diet/Diet Restrictions: 
Eight 8-ounce glasses of fluid daily (water, juices); avoid excessive caffeine intake. Meals/snacks as desired which are high in fiber and carbohydrates and low in fat and cholesterol. Physical Activity / Restrictions / Safety:  
 
Avoid heavy lifting, no more that 8 lbs. For 2-3 weeks; limit use of stairs to 2 times daily for the first week home. No driving for one week. Avoid intercourse 4-6 weeks, no douching or tampon use.  Check with obstetrician before starting or resuming an exercise program.    
 
 
Discharge Instructions/Special Treatment/Home Care Needs:  
 
Continue prenatal vitamins. Continue to use squirt bottle with warm water on your episiotomy after each bathroom use until bleeding stops. If steri-strips applied to your incision, remove in 7-10 days. Call your doctor for the following:  
 
Fever over 101 degrees by mouth. Vaginal bleeding heavier than a normal menstrual period or clot larger than a golf ball. Red streaks or increased swelling of legs, painful red streaks on your breast. 
Painful urination, constipation and increased pain or swelling or discharge with your incision. If you feel extremely anxious or overwhelmed. If you have thoughts of harming yourself and/or your baby. Pain Management:  
 
Pain Management:  
Take Acetaminophen (Tylenol) or Ibuprofen (Advil, Motrin), as directed for pain. Use a warm Sitz bath 3 times daily to relieve episiotomy or hemorrhoidal discomfort. Heating pad to  incision as needed. For hemorrhoidal discomfort, use Tucks and Anusol cream as needed and directed. Follow-Up Care: These are general instructions for a healthy lifestyle: No smoking/ No tobacco products/ Avoid exposure to second hand smoke Surgeon General's Warning:  Quitting smoking now greatly reduces serious risk to your health. Obesity, smoking, and sedentary lifestyle greatly increases your risk for illness A healthy diet, regular physical exercise & weight monitoring are important for maintaining a healthy lifestyle Recognize signs and symptoms of STROKE: 
 
F-face looks uneven A-arms unable to move or move unevenly S-speech slurred or non-existent T-time-call 911 as soon as signs and symptoms begin-DO NOT go Back to bed or wait to see if you get better-TIME IS BRAIN.  
 
 
 
Signed By: Jasiel Fernando RN Date: 3/28/2017 Time: 9:36 AM 
 
 
 
Chart Review Routing History Recipient Method Report Sent By Ny Dejesus None [address] IP Auto Routed Notes Mariella Hawkins MD [[id number]] 8/20/2014  9:11 AM 08/20/2014 None [address] IP Auto Routed Notes Mariella Hawkins MD [[id number]] 8/20/2014  9:18 PM 08/20/2014 None [address] IP Auto Routed Notes Mariella Hawkins MD [[id number]] 8/22/2014  8:52 AM 08/22/2014

## 2017-03-26 ENCOUNTER — ANESTHESIA (OUTPATIENT)
Dept: LABOR AND DELIVERY | Age: 32
End: 2017-03-26
Payer: COMMERCIAL

## 2017-03-26 ENCOUNTER — ANESTHESIA EVENT (OUTPATIENT)
Dept: LABOR AND DELIVERY | Age: 32
End: 2017-03-26
Payer: COMMERCIAL

## 2017-03-26 PROCEDURE — 77030014125 HC TY EPDRL BBMI -B: Performed by: ANESTHESIOLOGY

## 2017-03-26 PROCEDURE — 74011250637 HC RX REV CODE- 250/637: Performed by: OBSTETRICS & GYNECOLOGY

## 2017-03-26 PROCEDURE — 75410000002 HC LABOR FEE PER 1 HR: Performed by: OBSTETRICS & GYNECOLOGY

## 2017-03-26 PROCEDURE — 77030018749 HC HK AMNIO DISP DERY -A

## 2017-03-26 PROCEDURE — 65270000029 HC RM PRIVATE

## 2017-03-26 PROCEDURE — 77030034850

## 2017-03-26 PROCEDURE — 74011000250 HC RX REV CODE- 250

## 2017-03-26 PROCEDURE — 74011250636 HC RX REV CODE- 250/636: Performed by: OBSTETRICS & GYNECOLOGY

## 2017-03-26 PROCEDURE — 74011250636 HC RX REV CODE- 250/636: Performed by: ANESTHESIOLOGY

## 2017-03-26 PROCEDURE — 77030018846 HC SOL IRR STRL H20 ICUM -A

## 2017-03-26 RX ORDER — ZOLPIDEM TARTRATE 5 MG/1
5 TABLET ORAL
Status: DISCONTINUED | OUTPATIENT
Start: 2017-03-26 | End: 2017-03-28 | Stop reason: HOSPADM

## 2017-03-26 RX ORDER — HYDROCORTISONE ACETATE PRAMOXINE HCL 2.5; 1 G/100G; G/100G
CREAM TOPICAL AS NEEDED
Status: DISCONTINUED | OUTPATIENT
Start: 2017-03-26 | End: 2017-03-28 | Stop reason: HOSPADM

## 2017-03-26 RX ORDER — DIPHENHYDRAMINE HYDROCHLORIDE 50 MG/ML
12.5 INJECTION, SOLUTION INTRAMUSCULAR; INTRAVENOUS
Status: DISCONTINUED | OUTPATIENT
Start: 2017-03-26 | End: 2017-03-28 | Stop reason: HOSPADM

## 2017-03-26 RX ORDER — IBUPROFEN 800 MG/1
800 TABLET ORAL EVERY 8 HOURS
Status: DISCONTINUED | OUTPATIENT
Start: 2017-03-26 | End: 2017-03-28 | Stop reason: HOSPADM

## 2017-03-26 RX ORDER — RANITIDINE 150 MG/1
150 TABLET, FILM COATED ORAL 2 TIMES DAILY
COMMUNITY
End: 2022-04-05

## 2017-03-26 RX ORDER — ACETAMINOPHEN 325 MG/1
650 TABLET ORAL
Status: DISCONTINUED | OUTPATIENT
Start: 2017-03-26 | End: 2017-03-28 | Stop reason: HOSPADM

## 2017-03-26 RX ORDER — FENTANYL/BUPIVACAINE/NS/PF 2-1250MCG
1-16 PREFILLED PUMP RESERVOIR EPIDURAL CONTINUOUS
Status: DISCONTINUED | OUTPATIENT
Start: 2017-03-26 | End: 2017-03-26

## 2017-03-26 RX ORDER — HYDROCODONE BITARTRATE AND ACETAMINOPHEN 5; 325 MG/1; MG/1
1 TABLET ORAL
Status: DISCONTINUED | OUTPATIENT
Start: 2017-03-26 | End: 2017-03-28 | Stop reason: HOSPADM

## 2017-03-26 RX ORDER — ONDANSETRON 2 MG/ML
4 INJECTION INTRAMUSCULAR; INTRAVENOUS
Status: DISCONTINUED | OUTPATIENT
Start: 2017-03-26 | End: 2017-03-28 | Stop reason: HOSPADM

## 2017-03-26 RX ORDER — DOCUSATE SODIUM 100 MG/1
100 CAPSULE, LIQUID FILLED ORAL
Status: DISCONTINUED | OUTPATIENT
Start: 2017-03-26 | End: 2017-03-28 | Stop reason: HOSPADM

## 2017-03-26 RX ORDER — SIMETHICONE 80 MG
80 TABLET,CHEWABLE ORAL
Status: DISCONTINUED | OUTPATIENT
Start: 2017-03-26 | End: 2017-03-28 | Stop reason: HOSPADM

## 2017-03-26 RX ORDER — NALOXONE HYDROCHLORIDE 0.4 MG/ML
0.4 INJECTION, SOLUTION INTRAMUSCULAR; INTRAVENOUS; SUBCUTANEOUS AS NEEDED
Status: DISCONTINUED | OUTPATIENT
Start: 2017-03-26 | End: 2017-03-28 | Stop reason: HOSPADM

## 2017-03-26 RX ORDER — FENTANYL/BUPIVACAINE/NS/PF 2-1250MCG
PREFILLED PUMP RESERVOIR EPIDURAL
Status: DISCONTINUED
Start: 2017-03-26 | End: 2017-03-26

## 2017-03-26 RX ORDER — LIDOCAINE HYDROCHLORIDE AND EPINEPHRINE 20; 5 MG/ML; UG/ML
INJECTION, SOLUTION EPIDURAL; INFILTRATION; INTRACAUDAL; PERINEURAL AS NEEDED
Status: DISCONTINUED | OUTPATIENT
Start: 2017-03-26 | End: 2017-03-26 | Stop reason: HOSPADM

## 2017-03-26 RX ORDER — OXYTOCIN/RINGER'S LACTATE 20/1000 ML
125-500 PLASTIC BAG, INJECTION (ML) INTRAVENOUS ONCE
Status: ACTIVE | OUTPATIENT
Start: 2017-03-26 | End: 2017-03-26

## 2017-03-26 RX ORDER — SWAB
1 SWAB, NON-MEDICATED MISCELLANEOUS DAILY
Status: DISCONTINUED | OUTPATIENT
Start: 2017-03-26 | End: 2017-03-28 | Stop reason: HOSPADM

## 2017-03-26 RX ORDER — BUPIVACAINE HYDROCHLORIDE 2.5 MG/ML
INJECTION, SOLUTION EPIDURAL; INFILTRATION; INTRACAUDAL AS NEEDED
Status: DISCONTINUED | OUTPATIENT
Start: 2017-03-26 | End: 2017-03-26 | Stop reason: HOSPADM

## 2017-03-26 RX ADMIN — SODIUM CHLORIDE, SODIUM LACTATE, POTASSIUM CHLORIDE, AND CALCIUM CHLORIDE 125 ML/HR: 600; 310; 30; 20 INJECTION, SOLUTION INTRAVENOUS at 05:37

## 2017-03-26 RX ADMIN — IBUPROFEN 800 MG: 800 TABLET, FILM COATED ORAL at 09:37

## 2017-03-26 RX ADMIN — LIDOCAINE HYDROCHLORIDE AND EPINEPHRINE 7 ML: 20; 5 INJECTION, SOLUTION EPIDURAL; INFILTRATION; INTRACAUDAL; PERINEURAL at 01:28

## 2017-03-26 RX ADMIN — FENTANYL 0.2 MG/100ML-BUPIV 0.125%-NACL 0.9% EPIDURAL INJ 10 ML/HR: 2/0.125 SOLUTION at 01:21

## 2017-03-26 RX ADMIN — Medication 1 TABLET: at 09:37

## 2017-03-26 RX ADMIN — HYDROCODONE BITARTRATE AND ACETAMINOPHEN 1 TABLET: 5; 325 TABLET ORAL at 16:49

## 2017-03-26 RX ADMIN — HYDROCODONE BITARTRATE AND ACETAMINOPHEN 1 TABLET: 5; 325 TABLET ORAL at 12:19

## 2017-03-26 RX ADMIN — IBUPROFEN 800 MG: 800 TABLET, FILM COATED ORAL at 19:32

## 2017-03-26 RX ADMIN — ONDANSETRON 4 MG: 2 INJECTION INTRAMUSCULAR; INTRAVENOUS at 00:38

## 2017-03-26 RX ADMIN — HYDROCODONE BITARTRATE AND ACETAMINOPHEN 1 TABLET: 5; 325 TABLET ORAL at 21:10

## 2017-03-26 RX ADMIN — BUPIVACAINE HYDROCHLORIDE 10 ML: 2.5 INJECTION, SOLUTION EPIDURAL; INFILTRATION; INTRACAUDAL at 00:57

## 2017-03-26 RX ADMIN — Medication 2 MILLI-UNITS/MIN: at 05:03

## 2017-03-26 NOTE — PROGRESS NOTES
2209: Dr. Marina Gooden at bedside to evaluate patient. 0027: Patient requesting epidural. IV bolus initiated. 1: Dr. Jeffrey Galvin at bedside evaluating patient for epidural    0391: Report given to MOODY Lacy rN

## 2017-03-26 NOTE — DISCHARGE SUMMARY
Patient ID:  April Kat  [id number]  36 y.o.  1985    Admit Date: 3/25/2017    Discharge Date: 3/28/2017     Admitting Physician: Rosalba Mandel MD  Attending Physician: Rosalba Mandel MD    Admission Diagnoses:   1. IUP at 39.4 weeks in active labor  2. Grandmultiparity    Procedures for this admission:       Hospital Course: Uncomplicated    Discharge Diagnoses: Same as above with  producing a viable infant. Information for the patient's :  Marlon Barney [[id number]]   One Minute Apgar: 8 (Filed from Delivery Summary)  Five  Minute Apgar: 9 (Filed from Delivery Summary)  Discharge Disposition:  home    Discharge Condition:  Good    Additional Diagnoses: Grandmultiparity. Maternal Labs:   Lab Results   Component Value Date/Time    HBsAg, External negative 2016    HIV, External negative 2016    Rubella, External immune 2016    RPR, External nonreactive 2016    GrBStrep, External negative 2017       Cord Blood Results:   Information for the patient's :  Marlon Barney [[id number]]     Lab Results   Component Value Date/Time    ABO/Rh(D) O POSITIVE 2017 07:10 AM    CAMPBELL IgG NEG 2017 07:10 AM    Bilirubin if CAMPBELL pos: IF DIRECT DELVIN POSITIVE, BILIRUBIN TO FOLLOW 2017 07:10 AM           History of Present Illness:   OB History      Para Term  AB TAB SAB Ectopic Multiple Living    7 6 5      0 5        Admitted for active labor. Hospital Course:   Patient was admitted as above and delivered via . Please the chart for details. The postpartum course was unremarkable. She was deemed stable for discharge home on day 2.     Follow up with Dr. Yaya Hoffman MD in 6 weeks        Signed:  Yaya Hoffman MD  3/28/2017  5:51 AM

## 2017-03-26 NOTE — PROGRESS NOTES
Labor Progress Note  Patient seen, fetal heart rate and contraction pattern evaluated, patient examined. Feels better with epidural.   Patient Vitals for the past 2 hrs:   BP Temp Pulse Resp SpO2   03/26/17 0109 128/70 - 92 - -   03/26/17 0106 137/69 - 100 - -   03/26/17 0103 123/73 - 83 - 100 %   03/26/17 0059 130/72 98.3 °F (36.8 °C) 85 18 -   03/25/17 2347 - - - - 100 %   03/25/17 2345 119/75 - 87 - -   03/25/17 2317 - - - - 100 %   03/25/17 2316 115/79 - 80 - -       Physical Exam:  Cervical Exam:  4 cm dilated    50% effaced    -3 station    Presenting Part: cephalic  Uterine Activity: Frequency: Every 2 minutes  Fetal Heart Rate: Baseline: 120 per minute  Variability: moderate  Accelerations: no  Decelerations: none    Assessment/Plan:  Reassuring fetal status. Continue plan for vaginal delivery.     Isabel Montes De Oca MD

## 2017-03-26 NOTE — H&P
History & Physical    Name: Chelsea Keane MRN: [id number]  SSN: [id number]    YOB: 1985  Age: 36 y.o. Sex: female        Subjective:     Estimated Date of Delivery: 3/28/17  OB History      Para Term  AB TAB SAB Ectopic Multiple Living    8 4 4   1 2   4          Ms. Francine Menchaca is admitted with pregnancy at 39w4d for active labor. Prenatal course was significant for grand multiparity. Please see prenatal records for details. Past Medical History:   Diagnosis Date    Abnormal Pap smear     follow up normal    Abnormal Papanicolaou smear of cervix     Asthma     uses inhaler PRN     History reviewed. No pertinent surgical history. Social History     Occupational History    Not on file. Social History Main Topics    Smoking status: Never Smoker    Smokeless tobacco: Not on file    Alcohol use No    Drug use: No    Sexual activity: Yes     Partners: Male     Birth control/ protection: None     History reviewed. No pertinent family history. Allergies   Allergen Reactions    Shellfish Derived Anaphylaxis     Prior to Admission medications    Medication Sig Start Date End Date Taking? Authorizing Provider   valACYclovir (VALTREX) 500 mg tablet Take  by mouth two (2) times a day. Indications: GENITAL HERPES SIMPLEX, suppression started at 35 weeks    Historical Provider   docusate sodium (COLACE) 100 mg capsule Take 100 mg by mouth two (2) times a day. Historical Provider   hydrocodone-acetaminophen (NORCO) 5-325 mg per tablet Take 1 tablet by mouth every four (4) hours as needed. 14   Doyle Macias MD   ibuprofen (MOTRIN) 800 mg tablet Take 1 tablet by mouth every eight (8) hours. 14   Doyle Macias MD        Review of Systems: A comprehensive review of systems was negative except for that written in the HPI.     Objective:     Vitals:  Vitals:    177   Weight: 89.8 kg (198 lb)   Height: 5' 9\" (1.753 m)        Physical Exam:  Patient without distress. Abdomen: soft, nontender  Fundus: soft and non tender  Perineum: blood absent, amniotic fluid absent  Cervical Exam: 3 cm dilated    50% effaced    -3 station    Presenting Part: cephalic  Lower Extremities:  - Edema No  Membranes:  Artificial Rupture of Membranes; Amniotic Fluid: large amount of clear fluid  Fetal Heart Rate: Baseline: 130 per minute  Variability: moderate  Accelerations: yes  Decelerations: none  Uterine contractions: regular, every 2 minutes    Prenatal Labs:   Lab Results   Component Value Date/Time    Rubella, External immune 09/20/2016    GrBStrep, External negative 02/28/2017    HBsAg, External negative 09/20/2016    HIV, External negative 09/20/2016    RPR, External nonreactive 09/20/2016    Gonorrhea, External negative 08/30/2016    Chlamydia, External negative 08/30/2016        Assessment/Plan:     Plan: Admit for Reassuring fetal status, Labor  Progressing normally, Continue plan for vaginal delivery. Group B Strep was negative.     Signed By:  Marlena Martinez MD     March 25, 2017

## 2017-03-26 NOTE — PROGRESS NOTES
2127: Patient arrived to Labor and Delivery via wheelchair. States contractions starting this evening denies leaking fluid. + fetal movement. Per Dr. Dominick Montes De Oca patient to be admitted. MD placed orders. IV started.    2200: Report given to Vi Perry

## 2017-03-26 NOTE — PROGRESS NOTES
7:08 AM  Bedside and Verbal shift change report given to Jaqueline Mark RN (oncoming nurse) by Annmarie Bowling RN (offgoing nurse). Report included the following information SBAR, Kardex, Procedure Summary, Intake/Output, MAR, Recent Results and Med Rec Status. Assumed care of the pt.     8:25 AM  Verbal SBAR report off to Cuong Darnell RN.

## 2017-03-26 NOTE — PROGRESS NOTES
0320: Verbal SBAR report received from Nubia Brandt 103: Spoke with Dr. Barron Cruz on the phone. Updated MD on contraction pattern and early/variable decels. MD order for cervical exam and Pitocin to be started if cervix is unchanged. 12: Verbal order from Dr. Barron Cruz to start Pitocin at this time. 3674: Bedside and Verbal shift change report given to Tor Pennington rn (oncoming nurse) by Roselia Ortiz (offgoing nurse). Report included the following information SBAR, Kardex, Intake/Output, MAR and Recent Results.

## 2017-03-26 NOTE — ANESTHESIA PREPROCEDURE EVALUATION
Anesthetic History   No history of anesthetic complications            Review of Systems / Medical History  Patient summary reviewed, nursing notes reviewed and pertinent labs reviewed    Pulmonary            Asthma        Neuro/Psych   Within defined limits           Cardiovascular  Within defined limits                Exercise tolerance: >4 METS     GI/Hepatic/Renal  Within defined limits              Endo/Other  Within defined limits           Other Findings              Physical Exam    Airway  Mallampati: II  TM Distance: 4 - 6 cm  Neck ROM: normal range of motion   Mouth opening: Normal     Cardiovascular    Rhythm: regular  Rate: normal         Dental    Dentition: Upper dentition intact and Lower dentition intact     Pulmonary  Breath sounds clear to auscultation               Abdominal         Other Findings            Anesthetic Plan    ASA: 2  Anesthesia type: epidural          Induction: Intravenous  Anesthetic plan and risks discussed with: Patient

## 2017-03-26 NOTE — L&D DELIVERY NOTE
Delivery Summary    Patient: Harl Boast MRN: [id number]  SSN: [id number]    YOB: 1985  Age: 36 y.o. Sex: female        Labor Events:    Labor: No    Rupture Date: 3/26/2017    Rupture Time: 6:10 AM    Rupture Type AROM    Amniotic Fluid Volume:       Amniotic Fluid Description: Clear       Induction:           Augmentation: Oxytocin;AROM    Labor Complications: None     Additional Complications:        Cervical Ripening:       None      Delivery Events:  Episiotomy: None    Laceration(s): None       Repaired: None     Number of Repair Packets:      Suture Type and Size: None        Estimated Blood Loss (ml): 300        Information for the patient's newbornHank Welsh, Male [[id number]]     Delivery Summary - Baby    Delivery Date: 3/26/2017   Delivery Time: 6:23 AM   Delivery Type: Vaginal, Spontaneous Delivery  Sex:  male  Gestational Age: 38w11d  Delivery Clinician:  Pritesh Vargas  Living?: Yes   Delivery Location: L&D 209           APGARS  One minute Five minutes Ten minutes   Skin Color: 0    1       Heart Rate: 2   2         Reflex Irritability: 2   2         Muscle Tone: 2   2       Respiration: 2   2         Total: 8   9           Presentation: Vertex  Position: Left Occiput Anterior  Resuscitation Method:  Suctioning-bulb; Tactile Stimulation     Meconium Stained: None    Cord Information: 3 Vessels   Complications: Nuchal Cord Without Compressions x1, reduced  Cord Blood Sent?:  Yes    Blood Gases Sent?:  No    Placenta:  Date/Time: 3/26  6:27 AM  Removal: Spontaneous      Appearance: Normal     Unity Measurements:  Birth Weight:      Birth Length:     Head Circumference:       Chest Circumference:      Abdominal Girth:       Other Providers:   Vishal Rodas KIMBERLY;GENIE ROJAS;;;;;; Obstetrician;Primary Nurse;Primary Unity Nurse;Staff Nurse;Neonatologist;Anesthesiologist;Crna;Nurse Practitioner;Midwife;Nursery Nurse

## 2017-03-26 NOTE — LACTATION NOTE
Mother states she BF 2 of her previous 3 children for a \"few weeks\" and stopped because \"I was frustrated with all the feeding and crying\". Mother states baby nursed well following delivery but has been sleepy since. Mom attempted to express drops earlier to baby with no results. Assisted mother in waking, positioning, and latching baby to breast.  Baby very sleepy, latched in biological holding nipple in mouth but no sucking. Assisted mother with expressing drops to baby, mother able to express drops with breast massage. BF basics reviewed with parents. Encouraged mother to continue on expressing drops to baby. Discussed with mother her plan for feeding. Reviewed the benefits of exclusive breast milk feeding during the hospital stay. Informed her of the risks of using formula to supplement in the first few days of life as well as the benefits of successful breast milk feeding; referred her to the Breastfeeding booklet about this information. She acknowledges understanding of information reviewed and states that it is her plan to breastfeed her infant. Will support her choice and offer additional information as needed. Reviewed breastfeeding basics:  How milk is made and normal  breastfeeding behaviors discussed. Supply and demand,  stomach size, early feeding cues, skin to skin bonding with comfortable positioning and baby led latch-on reviewed. How to identify signs of successful breastfeeding sessions reviewed; education on assymetrical latch, signs of effective latching vs shallow, in-effective latching, normal  feeding frequency and duration and expected infant output discussed. Hand Expression Education:  Mom taught how to manually hand express her colostrum. Emphasized the importance of providing infant with valuable colostrum as infant rests skin to skin at breast.  Aware to avoid extended periods of non-feeding.   Aware to offer 10-20+ drops of colostrum every 2-3 hours until infant is latching and nursing effectively. Taught the rationale behind this low tech but highly effective evidence based practice. Pt will successfully establish breastfeeding by feeding in response to early feeding cues   or wake every 3h, will obtain deep latch, and will keep log of feedings/output. Taught to BF at hunger cues and or q 2-3 hrs and to offer 10-20 drops of hand expressed colostrum at any non-feeds. Breast Assessment  Left Breast: Medium  Left Nipple: Everted, Intact  Right Breast: Medium  Right Nipple: Everted, Intact  Breast- Feeding Assessment  Attends Breast-Feeding Classes: No  Breast-Feeding Experience: Yes (BF 2 for her previous 3 for a \"few weeks but got frustrated\")  Breast Trauma/Surgery: No  Type/Quality: Attempted  Lactation Consultant Visits  Breast-Feedings: Attempted breast-feeding  Mother/Infant Observation  Mother Observation: Breast comfortable, Recognizes feeding cues  Infant Observation: Relaxed after feeding, Feeding cues  LATCH Documentation  Latch:  Too sleepy or reluctant, no latch achieved  Audible Swallowing: A few with stimulation (mom expressing drops to baby, 3 drops expressed)  Type of Nipple: Everted (after stimulation) (areola thick)  Comfort (Breast/Nipple): Soft/non-tender  Hold (Positioning): Full assist, teach one side, mother does other, staff holds  [facility] Score: 6

## 2017-03-26 NOTE — PROGRESS NOTES
Labor Progress Note  Patient seen, fetal heart rate and contraction pattern evaluated, patient examined.   Patient Vitals for the past 2 hrs:   BP Temp Pulse Resp SpO2   03/26/17 0449 - - - - 100 %   03/26/17 0444 - - - - 100 %   03/26/17 0442 112/64 - 86 - -   03/26/17 0439 - - - - 100 %   03/26/17 0434 - - - - 100 %   03/26/17 0429 - - - - 100 %   03/26/17 0424 - - - - 100 %   03/26/17 0419 - - - - 100 %   03/26/17 0414 - - - - 100 %   03/26/17 0413 106/73 98.5 °F (36.9 °C) 98 16 -   03/26/17 0409 - - - - 100 %   03/26/17 0404 - - - - 100 %   03/26/17 0359 - - - - 98 %   03/26/17 0354 - - - - 99 %   03/26/17 0349 - - - - 100 %   03/26/17 0344 - - - - 99 %   03/26/17 0343 118/74 - 88 - -   03/26/17 0339 - - - - 99 %   03/26/17 0334 - - - - 99 %   03/26/17 0329 - - - - 99 %   03/26/17 0324 - - - - 99 %   03/26/17 0319 - - - - 100 %   03/26/17 0314 - - - - 100 %   03/26/17 0312 122/69 - 91 - -   03/26/17 0309 - - - - 100 %   03/26/17 0304 - - - - 100 %   03/26/17 0259 - - - - 100 %       Physical Exam:  Cervical Exam:  6 cm dilated    70% effaced    -1 station    Presenting Part: cephalic  Uterine Activity: Frequency: Every 3 minutes  Fetal Heart Rate: Baseline: 125 per minute  Variability: moderate  Accelerations: yes  Decelerations: variable    Assessment/Plan:  Reassuring fetal status, Labor  Not progressing normally  start pitocin augmentation, Continue plan for vaginal delivery    Ricky Olvera MD

## 2017-03-27 PROCEDURE — 65270000029 HC RM PRIVATE

## 2017-03-27 PROCEDURE — 74011250637 HC RX REV CODE- 250/637: Performed by: OBSTETRICS & GYNECOLOGY

## 2017-03-27 RX ADMIN — HYDROCODONE BITARTRATE AND ACETAMINOPHEN 1 TABLET: 5; 325 TABLET ORAL at 14:29

## 2017-03-27 RX ADMIN — IBUPROFEN 800 MG: 800 TABLET, FILM COATED ORAL at 20:24

## 2017-03-27 RX ADMIN — IBUPROFEN 800 MG: 800 TABLET, FILM COATED ORAL at 12:39

## 2017-03-27 RX ADMIN — IBUPROFEN 800 MG: 800 TABLET, FILM COATED ORAL at 03:10

## 2017-03-27 RX ADMIN — HYDROCODONE BITARTRATE AND ACETAMINOPHEN 1 TABLET: 5; 325 TABLET ORAL at 03:10

## 2017-03-27 RX ADMIN — Medication 1 TABLET: at 08:35

## 2017-03-27 RX ADMIN — HYDROCODONE BITARTRATE AND ACETAMINOPHEN 1 TABLET: 5; 325 TABLET ORAL at 08:34

## 2017-03-27 RX ADMIN — HYDROCODONE BITARTRATE AND ACETAMINOPHEN 1 TABLET: 5; 325 TABLET ORAL at 20:24

## 2017-03-27 NOTE — PROGRESS NOTES
Bedside and Verbal shift change report given to Tabatha Zuñiga RN (oncoming nurse) by Tammy Wilhelm RN (offgoing nurse). Report included the following information SBAR, Kardex, Intake/Output and MAR.

## 2017-03-27 NOTE — ROUTINE PROCESS
Bedside and Verbal shift change report given to [address] [place] [address]. RN(oncoming nurse) by Chiquita Arboleda RN (offgoing nurse). Report included the following information SBAR, Kardex, Intake/Output, MAR and Recent Results.

## 2017-03-27 NOTE — LACTATION NOTE
Mother states baby has been latching to left side well but does not latch well to right, assisted mother with positioning and assisted with latch on left side, baby latched well. Also showed mother how to hand express to help get baby more interested. Discussed with mother her plan for feeding. Reviewed the benefits of exclusive breast milk feeding during the hospital stay. Informed her of the risks of using formula to supplement in the first few days of life as well as the benefits of successful breast milk feeding; referred her to the Breastfeeding booklet about this information. She acknowledges understanding of information reviewed and states that it is her plan to breastfeed her infant. Mother does state that she was not successful with breastfeeding her other children, most she only tried for days and they did not latch well so she stopped and switched to to formula quickly. Will support her choice and offer additional information as needed. Reviewed breastfeeding basics:  How milk is made and normal  breastfeeding behaviors discussed. Supply and demand,  stomach size, early feeding cues, skin to skin bonding with comfortable positioning and baby led latch-on reviewed. How to identify signs of successful breastfeeding sessions reviewed; education on assymetrical latch, signs of effective latching vs shallow, in-effective latching, normal  feeding frequency and duration and expected infant output discussed. Normal course of breastfeeding discussed including the AAP's recommendation that children receive exclusive breast milk feedings for the first six months of life with breast milk feedings to continue through the first year of life and/or beyond as complimentary table foods are added. Breastfeeding Booklet and Warm line information provided with discussion.   Discussed typical  weight loss and the importance of pediatrician appointment within 24-48 hours of discharge, at 2 weeks of life and normalcy of requesting pediatric weight checks as needed in between visits. Pt will successfully establish breastfeeding by feeding in response to early feeding cues   or wake every 3h, will obtain deep latch, and will keep log of feedings/output. Taught to BF at hunger cues and or q 2-3 hrs and to offer 10-20 drops of hand expressed colostrum at any non-feeds.       Breast Assessment  Left Breast: Medium  Left Nipple: Everted, Intact  Right Breast: Medium  Right Nipple: Everted, Intact  Breast- Feeding Assessment  Attends Breast-Feeding Classes: No  Breast-Feeding Experience: Yes (mother states only a few days with last 3, changed to formul)  Breast Trauma/Surgery: No  Type/Quality: Good  Lactation Consultant Visits  Breast-Feedings: Good   Mother/Infant Observation  Mother Observation: Alignment, Breast comfortable, Close hold  Infant Observation: Latches nipple and aereolae, Lips flanged, lower, Lips flanged, upper, Opens mouth  LATCH Documentation  Latch: Grasps breast, tongue down, lips flanged, rhythmic sucking  Audible Swallowing: A few with stimulation  Type of Nipple: Everted (after stimulation)  Comfort (Breast/Nipple): Soft/non-tender  Hold (Positioning): Full assist, teach one side, mother does other, staff holds (BN tips shared)  LATCH Score: 8

## 2017-03-27 NOTE — PROGRESS NOTES
Post-Partum Day Number 1 Progress Note    April Kat       Information for the patient's :  Francine Menchaca, Male [[id number]]   Vaginal, Spontaneous Delivery   Patient doing well without significant complaint. Voiding without difficulty, normal lochia. Tolerating diet without nausea or vomiting. Pain controlled with oral medications. Vitals:  Visit Vitals    /78 (BP 1 Location: Right arm, BP Patient Position: At rest)    Pulse 78    Temp 98.3 °F (36.8 °C)    Resp 16    Ht 5' 9\" (1.753 m)    Wt 89.8 kg (198 lb)    SpO2 100%    Breastfeeding Yes    BMI 29.24 kg/m2     Temp (24hrs), Av.3 °F (36.8 °C), Min:98.2 °F (36.8 °C), Max:98.6 °F (37 °C)        Exam:   Patient without distress. FF @ U-2 NT                LE NT w/o edema    Labs:     Lab Results   Component Value Date/Time    WBC 10.9 2017 09:52 PM    WBC 7.9 2014 10:40 AM    WBC 8.9 2009 05:15 AM    HGB 10.2 2017 09:52 PM    HGB 11.5 2014 10:40 AM    HGB 10.5 2009 05:15 AM    HCT 31.9 2017 09:52 PM    HCT 34.6 2014 10:40 AM    HCT 32.0 2009 05:15 AM    PLATELET 456  09:52 PM    PLATELET 946 8042 10:40 AM    PLATELET 070  05:15 AM     Lab Results   Component Value Date/Time    Rubella, External immune 2016    GrBStrep, External negative 2017    HBsAg, External negative 2016    HIV, External negative 2016    RPR, External nonreactive 2016    Gonorrhea, External negative 2016    Chlamydia, External negative 2016         Information for the patient's :  Francine Menchaca, Male [[id number]]   One Minute Apgar: 8 (Filed from Delivery Summary)  Five  Minute Apgar: 9 (Filed from Delivery Summary)        No results found for this or any previous visit (from the past 24 hour(s)). Assessment:   PPD 1 s/p , Doing well and stable  Plan:  1.  Continue routine postpartum care      Jennifer Spaulding, MD  3/27/2017  7:39 AM

## 2017-03-27 NOTE — PROGRESS NOTES
Bedside and Verbal shift change report given to Alli Marquez RN (oncoming nurse) by Estrella De Luna RNC (offgoing nurse).  Report included the following information SBAR, Kardex, Intake/Output, MAR and Recent Results

## 2017-03-28 VITALS
OXYGEN SATURATION: 100 % | RESPIRATION RATE: 16 BRPM | HEART RATE: 84 BPM | WEIGHT: 198 LBS | TEMPERATURE: 98.4 F | HEIGHT: 69 IN | DIASTOLIC BLOOD PRESSURE: 78 MMHG | SYSTOLIC BLOOD PRESSURE: 123 MMHG | BODY MASS INDEX: 29.33 KG/M2

## 2017-03-28 PROCEDURE — 74011250637 HC RX REV CODE- 250/637: Performed by: OBSTETRICS & GYNECOLOGY

## 2017-03-28 RX ORDER — IBUPROFEN 600 MG/1
600 TABLET ORAL
Qty: 30 TAB | Refills: 0 | Status: ON HOLD | OUTPATIENT
Start: 2017-03-28 | End: 2019-08-04

## 2017-03-28 RX ADMIN — HYDROCODONE BITARTRATE AND ACETAMINOPHEN 1 TABLET: 5; 325 TABLET ORAL at 11:10

## 2017-03-28 RX ADMIN — HYDROCODONE BITARTRATE AND ACETAMINOPHEN 1 TABLET: 5; 325 TABLET ORAL at 01:18

## 2017-03-28 RX ADMIN — IBUPROFEN 800 MG: 800 TABLET, FILM COATED ORAL at 06:37

## 2017-03-28 RX ADMIN — Medication 1 TABLET: at 09:26

## 2017-03-28 RX ADMIN — HYDROCODONE BITARTRATE AND ACETAMINOPHEN 1 TABLET: 5; 325 TABLET ORAL at 06:37

## 2017-03-28 NOTE — PROGRESS NOTES
Problem: Lactation Care Plan  Goal: *Infant latching appropriately  Outcome: Resolved/Met Date Met:  17  Pt will successfully establish breastfeeding by feeding in response to infant's early feeding cues and/or to offer breast every 2-3 hours. Ways to obtain a deep latch and seek comfortable positioning shared, aware to keep log of feedings/output. Goal: *Weight loss less than 10% of birth weight  Outcome: Resolved/Met Date Met:  17  Infant weight loss is -6.9%. Reviewed breastfeeding basics:  Supply and demand, breastfeed baby 8-12 times in 24 hr.,  stomach size, early  Feeding cues, skin to skin, positioning and baby led latch-on, assymetrical latch with signs of good, deep latch vs shallow, feeding frequency and duration, and log sheet for tracking infant feedings and output. Breastfeeding Booklet and Warm line information given. Discussed typical  weight loss and the importance of infant weight checks with pediatrician 1-2 post discharge. Mother states baby has a pediatric appointment tomorrow. Problem: Patient Education: Go to Patient Education Activity  Goal: Patient/Family Education  Outcome: Resolved/Met Date Met:  17  Engorgement Care Guidelines given if needed:  Reviewed how milk is made and normal phases of milk production. Taught care of engorged breasts - frequent breastfeeding encouraged, cool packs and motrin as tolerated. Anticipatory guidance shared. Discussed eating a healthy diet. Instructed mother to eat a variety of foods in order to get a well balanced diet. She should consume an extra 500 calories per day (more than her non-pregnant requirement.) These extra calories will help provide energy needed for optimal breast milk production. Mother also encouraged to \"drink to thirst\" and it is recommended that she drink fluids such as water, fruit/vegetable juice.  Nutritious snacks should be available so that she can eat throughout the day to help satisfy her hunger and maintain a good milk supply. Care for sore/tender nipples discussed:  ways to improve positioning and latch practiced and discussed, hand express colostrum after feedings and let air dry, light application of lanolin, hydrogel pads, seek comfortable laid back feeding position, start feedings on least sore side first.     Chart shows numerous feedings, void, stool WNL. Discussed importance of monitoring outputs and feedings on first week of life. Discussed ways to tell if baby is  getting enough breast milk, ie  voids and stools, change in color of stool, and return to birth wt within 2 weeks. Follow up with pediatrician visit for weight check in 1-2 days (per AAP guidelines.)  Encouraged to call Warm Line  [phone] or [facility] at [phone] for any questions/problems that arise. Mother also given breastfeeding support group dates and times for any future needs           Comments:   Pt will successfully establish breastfeeding by feeding in response to early feeding cues   or wake every 3h, will obtain deep latch, and will keep log of feedings/output. Taught to BF at hunger cues and or q 2-3 hrs and to offer 10-20 drops of hand expressed colostrum at any non-feeds. Breast Assessment  Left Breast: Medium (Breasts are full - milk came in today.)  Left Nipple: Everted, Intact  Right Breast: Medium  Right Nipple: Everted, Intact  Breast- Feeding Assessment  Attends Breast-Feeding Classes: No  Breast-Feeding Experience: Yes ( only a few days)  Breast Trauma/Surgery: No  Type/Quality: Good (Mother states baby has been latching on well and breastfeeding well. )  Lactation Consultant Visits  Breast-Feedings:  (Mother and baby for discharge.  Mother states baby last  at 56 for 25 minutes and baby fed well per mother.)  Mother/Infant Observation  Mother Observation: Recognizes feeding cues, Close hold (Per mother)  Infant Observation: Feeding cues, Opens mouth, Rhythmic suck (Per mother)  LATCH Documentation  Latch: Grasps breast, tongue down, lips flanged, rhythmic sucking (Per mother)  Audible Swallowing: A few with stimulation (Per mother)  Type of Nipple: Everted (after stimulation)  Comfort (Breast/Nipple): Filling, red/small blisters/bruises, mild/mod discomfort (Nipples are tender but intact per mother)  Hold (Positioning): No assist from staff, mother able to position/hold infant (Per mother)  LATCH Score: 6  Instructed mother to call [facility] if baby breastfeeds again before discharge.

## 2017-03-28 NOTE — PROGRESS NOTES
Bedside and Verbal shift change report given to HENRIK Chase RN (oncoming nurse) by Ken Watson RN (offgoing nurse). Report included the following information SBAR, Kardex, Intake/Output, MAR and Recent Results.

## 2017-03-28 NOTE — DISCHARGE INSTRUCTIONS
POST DELIVERY DISCHARGE INSTRUCTIONS    Name: Grace Stephens  YOB: 1985  Primary Diagnosis: Active Problems:    Normal labor (3/25/2017)        General:     Diet/Diet Restrictions:  Eight 8-ounce glasses of fluid daily (water, juices); avoid excessive caffeine intake. Meals/snacks as desired which are high in fiber and carbohydrates and low in fat and cholesterol. Physical Activity / Restrictions / Safety:     Avoid heavy lifting, no more that 8 lbs. For 2-3 weeks; limit use of stairs to 2 times daily for the first week home. No driving for one week. Avoid intercourse 4-6 weeks, no douching or tampon use. Check with obstetrician before starting or resuming an exercise program.         Discharge Instructions/Special Treatment/Home Care Needs:     Continue prenatal vitamins. Continue to use squirt bottle with warm water on your episiotomy after each bathroom use until bleeding stops. If steri-strips applied to your incision, remove in 7-10 days. Call your doctor for the following:     Fever over 101 degrees by mouth. Vaginal bleeding heavier than a normal menstrual period or clot larger than a golf ball. Red streaks or increased swelling of legs, painful red streaks on your breast.  Painful urination, constipation and increased pain or swelling or discharge with your incision. If you feel extremely anxious or overwhelmed. If you have thoughts of harming yourself and/or your baby. Pain Management:     Pain Management:   Take Acetaminophen (Tylenol) or Ibuprofen (Advil, Motrin), as directed for pain. Use a warm Sitz bath 3 times daily to relieve episiotomy or hemorrhoidal discomfort. Heating pad to  incision as needed. For hemorrhoidal discomfort, use Tucks and Anusol cream as needed and directed. Follow-Up Care:      These are general instructions for a healthy lifestyle:    No smoking/ No tobacco products/ Avoid exposure to second hand smoke    Surgeon General's Warning: Quitting smoking now greatly reduces serious risk to your health. Obesity, smoking, and sedentary lifestyle greatly increases your risk for illness    A healthy diet, regular physical exercise & weight monitoring are important for maintaining a healthy lifestyle    Recognize signs and symptoms of STROKE:    F-face looks uneven    A-arms unable to move or move unevenly    S-speech slurred or non-existent    T-time-call 911 as soon as signs and symptoms begin-DO NOT go       Back to bed or wait to see if you get better-TIME IS BRAIN.         Signed By: Alex Weems RN                                                                                                   Date: 3/28/2017 Time: 9:36 AM

## 2017-03-28 NOTE — PROGRESS NOTES
Pt was given discharge instructions and verbalized understanding. Pt states that she will make a 6 week follow up appointment. Pt states that she knows when to call the doctor. Pt was discharged in stable condition.

## 2019-01-04 LAB
ANTIBODY SCREEN, EXTERNAL: NEGATIVE
HBSAG, EXTERNAL: NEGATIVE
HIV, EXTERNAL: NEGATIVE
RPR, EXTERNAL: NORMAL
RUBELLA, EXTERNAL: NORMAL

## 2019-01-10 LAB
CHLAMYDIA, EXTERNAL: NEGATIVE
N. GONORRHEA, EXTERNAL: NEGATIVE

## 2019-07-11 LAB — GRBS, EXTERNAL: NEGATIVE

## 2019-08-04 ENCOUNTER — ANESTHESIA (OUTPATIENT)
Dept: LABOR AND DELIVERY | Age: 34
End: 2019-08-04
Payer: COMMERCIAL

## 2019-08-04 ENCOUNTER — ANESTHESIA EVENT (OUTPATIENT)
Dept: LABOR AND DELIVERY | Age: 34
End: 2019-08-04
Payer: COMMERCIAL

## 2019-08-04 ENCOUNTER — HOSPITAL ENCOUNTER (INPATIENT)
Age: 34
LOS: 2 days | Discharge: HOME | End: 2019-08-06
Attending: OBSTETRICS & GYNECOLOGY | Admitting: OBSTETRICS & GYNECOLOGY
Payer: COMMERCIAL

## 2019-08-04 LAB
ABO + RH BLD: NORMAL
BLOOD GROUP ANTIBODIES SERPL: NORMAL
ERYTHROCYTE [DISTWIDTH] IN BLOOD BY AUTOMATED COUNT: 14.3 % (ref 11.5–14.5)
HCT VFR BLD AUTO: 36.1 % (ref 35–47)
HGB BLD-MCNC: 11.7 G/DL (ref 11.5–16)
MCH RBC QN AUTO: 25.7 PG (ref 26–34)
MCHC RBC AUTO-ENTMCNC: 32.4 G/DL (ref 30–36.5)
MCV RBC AUTO: 79.3 FL (ref 80–99)
NRBC # BLD: 0 K/UL (ref 0–0.01)
NRBC BLD-RTO: 0 PER 100 WBC
PLATELET # BLD AUTO: 204 K/UL (ref 150–400)
PMV BLD AUTO: 11.1 FL (ref 8.9–12.9)
RBC # BLD AUTO: 4.55 M/UL (ref 3.8–5.2)
SPECIMEN EXP DATE BLD: NORMAL
WBC # BLD AUTO: 11.9 K/UL (ref 3.6–11)

## 2019-08-04 PROCEDURE — 74011250636 HC RX REV CODE- 250/636: Performed by: OBSTETRICS & GYNECOLOGY

## 2019-08-04 PROCEDURE — 76060000078 HC EPIDURAL ANESTHESIA: Performed by: ANESTHESIOLOGY

## 2019-08-04 PROCEDURE — 36415 COLL VENOUS BLD VENIPUNCTURE: CPT

## 2019-08-04 PROCEDURE — 74011000250 HC RX REV CODE- 250: Performed by: OBSTETRICS & GYNECOLOGY

## 2019-08-04 PROCEDURE — 77030011943

## 2019-08-04 PROCEDURE — 3E0R3BZ INTRODUCTION OF ANESTHETIC AGENT INTO SPINAL CANAL, PERCUTANEOUS APPROACH: ICD-10-PCS | Performed by: ANESTHESIOLOGY

## 2019-08-04 PROCEDURE — 74011000250 HC RX REV CODE- 250: Performed by: ANESTHESIOLOGY

## 2019-08-04 PROCEDURE — 75410000000 HC DELIVERY VAGINAL/SINGLE: Performed by: OBSTETRICS & GYNECOLOGY

## 2019-08-04 PROCEDURE — 10907ZC DRAINAGE OF AMNIOTIC FLUID, THERAPEUTIC FROM PRODUCTS OF CONCEPTION, VIA NATURAL OR ARTIFICIAL OPENING: ICD-10-PCS | Performed by: OBSTETRICS & GYNECOLOGY

## 2019-08-04 PROCEDURE — 77030014125 HC TY EPDRL BBMI -B: Performed by: ANESTHESIOLOGY

## 2019-08-04 PROCEDURE — 75410000003 HC RECOV DEL/VAG/CSECN EA 0.5 HR: Performed by: OBSTETRICS & GYNECOLOGY

## 2019-08-04 PROCEDURE — 65270000029 HC RM PRIVATE

## 2019-08-04 PROCEDURE — 74011000250 HC RX REV CODE- 250

## 2019-08-04 PROCEDURE — 99284 EMERGENCY DEPT VISIT MOD MDM: CPT

## 2019-08-04 PROCEDURE — 86900 BLOOD TYPING SEROLOGIC ABO: CPT

## 2019-08-04 PROCEDURE — 74011250637 HC RX REV CODE- 250/637: Performed by: OBSTETRICS & GYNECOLOGY

## 2019-08-04 PROCEDURE — 75410000002 HC LABOR FEE PER 1 HR: Performed by: OBSTETRICS & GYNECOLOGY

## 2019-08-04 PROCEDURE — 85027 COMPLETE CBC AUTOMATED: CPT

## 2019-08-04 PROCEDURE — 59025 FETAL NON-STRESS TEST: CPT

## 2019-08-04 RX ORDER — ONDANSETRON 2 MG/ML
4 INJECTION INTRAMUSCULAR; INTRAVENOUS
Status: DISCONTINUED | OUTPATIENT
Start: 2019-08-04 | End: 2019-08-05 | Stop reason: HOSPADM

## 2019-08-04 RX ORDER — SWAB
1 SWAB, NON-MEDICATED MISCELLANEOUS DAILY
Status: DISCONTINUED | OUTPATIENT
Start: 2019-08-05 | End: 2019-08-06 | Stop reason: HOSPADM

## 2019-08-04 RX ORDER — OXYTOCIN/RINGER'S LACTATE 20/1000 ML
125-500 PLASTIC BAG, INJECTION (ML) INTRAVENOUS ONCE
Status: COMPLETED | OUTPATIENT
Start: 2019-08-05 | End: 2019-08-04

## 2019-08-04 RX ORDER — CALCIUM CARBONATE 200(500)MG
200 TABLET,CHEWABLE ORAL
Status: DISCONTINUED | OUTPATIENT
Start: 2019-08-04 | End: 2019-08-06 | Stop reason: HOSPADM

## 2019-08-04 RX ORDER — ZOLPIDEM TARTRATE 5 MG/1
5 TABLET ORAL
Status: DISCONTINUED | OUTPATIENT
Start: 2019-08-04 | End: 2019-08-06 | Stop reason: HOSPADM

## 2019-08-04 RX ORDER — MAG HYDROX/ALUMINUM HYD/SIMETH 200-200-20
30 SUSPENSION, ORAL (FINAL DOSE FORM) ORAL
Status: DISCONTINUED | OUTPATIENT
Start: 2019-08-04 | End: 2019-08-05 | Stop reason: HOSPADM

## 2019-08-04 RX ORDER — CALCIUM CARBONATE 200(500)MG
2 TABLET,CHEWABLE ORAL 3 TIMES DAILY
COMMUNITY
End: 2022-04-05

## 2019-08-04 RX ORDER — SIMETHICONE 80 MG
80 TABLET,CHEWABLE ORAL
Status: DISCONTINUED | OUTPATIENT
Start: 2019-08-04 | End: 2019-08-06 | Stop reason: HOSPADM

## 2019-08-04 RX ORDER — FENTANYL/BUPIVACAINE/NS/PF 2-1250MCG
1-16 PREFILLED PUMP RESERVOIR EPIDURAL CONTINUOUS
Status: DISCONTINUED | OUTPATIENT
Start: 2019-08-04 | End: 2019-08-06

## 2019-08-04 RX ORDER — NALOXONE HYDROCHLORIDE 0.4 MG/ML
0.4 INJECTION, SOLUTION INTRAMUSCULAR; INTRAVENOUS; SUBCUTANEOUS AS NEEDED
Status: DISCONTINUED | OUTPATIENT
Start: 2019-08-04 | End: 2019-08-06 | Stop reason: HOSPADM

## 2019-08-04 RX ORDER — SODIUM CHLORIDE, SODIUM LACTATE, POTASSIUM CHLORIDE, CALCIUM CHLORIDE 600; 310; 30; 20 MG/100ML; MG/100ML; MG/100ML; MG/100ML
125 INJECTION, SOLUTION INTRAVENOUS CONTINUOUS
Status: DISCONTINUED | OUTPATIENT
Start: 2019-08-04 | End: 2019-08-06

## 2019-08-04 RX ORDER — NALOXONE HYDROCHLORIDE 0.4 MG/ML
0.4 INJECTION, SOLUTION INTRAMUSCULAR; INTRAVENOUS; SUBCUTANEOUS AS NEEDED
Status: DISCONTINUED | OUTPATIENT
Start: 2019-08-04 | End: 2019-08-05 | Stop reason: HOSPADM

## 2019-08-04 RX ORDER — DOCUSATE SODIUM 100 MG/1
100 CAPSULE, LIQUID FILLED ORAL
Status: DISCONTINUED | OUTPATIENT
Start: 2019-08-04 | End: 2019-08-06 | Stop reason: HOSPADM

## 2019-08-04 RX ORDER — DIPHENHYDRAMINE HCL 25 MG
25 CAPSULE ORAL
Status: DISCONTINUED | OUTPATIENT
Start: 2019-08-04 | End: 2019-08-06 | Stop reason: HOSPADM

## 2019-08-04 RX ORDER — ACETAMINOPHEN 325 MG/1
650 TABLET ORAL
Status: DISCONTINUED | OUTPATIENT
Start: 2019-08-04 | End: 2019-08-06 | Stop reason: HOSPADM

## 2019-08-04 RX ORDER — IBUPROFEN 800 MG/1
800 TABLET ORAL EVERY 8 HOURS
Status: DISCONTINUED | OUTPATIENT
Start: 2019-08-05 | End: 2019-08-06 | Stop reason: HOSPADM

## 2019-08-04 RX ORDER — ACETAMINOPHEN 325 MG/1
650 TABLET ORAL
Status: DISCONTINUED | OUTPATIENT
Start: 2019-08-04 | End: 2019-08-05 | Stop reason: HOSPADM

## 2019-08-04 RX ORDER — SODIUM CHLORIDE 0.9 % (FLUSH) 0.9 %
5-40 SYRINGE (ML) INJECTION EVERY 8 HOURS
Status: DISCONTINUED | OUTPATIENT
Start: 2019-08-04 | End: 2019-08-06

## 2019-08-04 RX ORDER — EPHEDRINE SULFATE/0.9% NACL/PF 50 MG/5 ML
20 SYRINGE (ML) INTRAVENOUS
Status: DISCONTINUED | OUTPATIENT
Start: 2019-08-04 | End: 2019-08-05 | Stop reason: HOSPADM

## 2019-08-04 RX ORDER — BUPIVACAINE HYDROCHLORIDE 2.5 MG/ML
INJECTION, SOLUTION EPIDURAL; INFILTRATION; INTRACAUDAL AS NEEDED
Status: DISCONTINUED | OUTPATIENT
Start: 2019-08-04 | End: 2019-08-04 | Stop reason: HOSPADM

## 2019-08-04 RX ORDER — CALCIUM CARBONATE 200(500)MG
400 TABLET,CHEWABLE ORAL 3 TIMES DAILY
Status: CANCELLED | OUTPATIENT
Start: 2019-08-05

## 2019-08-04 RX ORDER — OXYTOCIN/0.9 % SODIUM CHLORIDE 30/500 ML
0-20 PLASTIC BAG, INJECTION (ML) INTRAVENOUS
Status: DISCONTINUED | OUTPATIENT
Start: 2019-08-04 | End: 2019-08-06

## 2019-08-04 RX ORDER — HYDROCODONE BITARTRATE AND ACETAMINOPHEN 5; 325 MG/1; MG/1
1 TABLET ORAL
Status: DISCONTINUED | OUTPATIENT
Start: 2019-08-04 | End: 2019-08-06 | Stop reason: HOSPADM

## 2019-08-04 RX ORDER — ONDANSETRON 4 MG/1
4 TABLET, ORALLY DISINTEGRATING ORAL
Status: ACTIVE | OUTPATIENT
Start: 2019-08-04 | End: 2019-08-05

## 2019-08-04 RX ORDER — SODIUM CHLORIDE 0.9 % (FLUSH) 0.9 %
5-40 SYRINGE (ML) INJECTION AS NEEDED
Status: DISCONTINUED | OUTPATIENT
Start: 2019-08-04 | End: 2019-08-06 | Stop reason: HOSPADM

## 2019-08-04 RX ORDER — HYDROCORTISONE ACETATE PRAMOXINE HCL 2.5; 1 G/100G; G/100G
CREAM TOPICAL AS NEEDED
Status: DISCONTINUED | OUTPATIENT
Start: 2019-08-04 | End: 2019-08-06 | Stop reason: HOSPADM

## 2019-08-04 RX ORDER — LIDOCAINE HYDROCHLORIDE AND EPINEPHRINE 15; 5 MG/ML; UG/ML
INJECTION, SOLUTION EPIDURAL AS NEEDED
Status: DISCONTINUED | OUTPATIENT
Start: 2019-08-04 | End: 2019-08-04 | Stop reason: HOSPADM

## 2019-08-04 RX ADMIN — BUPIVACAINE HYDROCHLORIDE 10 ML: 2.5 INJECTION, SOLUTION EPIDURAL; INFILTRATION; INTRACAUDAL; PERINEURAL at 16:33

## 2019-08-04 RX ADMIN — ONDANSETRON 4 MG: 2 INJECTION INTRAMUSCULAR; INTRAVENOUS at 19:25

## 2019-08-04 RX ADMIN — FAMOTIDINE 20 MG: 10 INJECTION, SOLUTION INTRAVENOUS at 17:22

## 2019-08-04 RX ADMIN — SODIUM CHLORIDE, SODIUM LACTATE, POTASSIUM CHLORIDE, AND CALCIUM CHLORIDE 999 ML/HR: 600; 310; 30; 20 INJECTION, SOLUTION INTRAVENOUS at 16:24

## 2019-08-04 RX ADMIN — LIDOCAINE HYDROCHLORIDE AND EPINEPHRINE 3 ML: 15; 5 INJECTION, SOLUTION EPIDURAL at 16:34

## 2019-08-04 RX ADMIN — OXYTOCIN-SODIUM CHLORIDE 0.9% IV SOLN 30 UNIT/500ML 999 MILLI-UNITS/MIN: 30-0.9/5 SOLUTION at 22:30

## 2019-08-04 RX ADMIN — Medication 10 ML/HR: at 16:46

## 2019-08-04 RX ADMIN — ANTACID TABLETS 200 MG: 500 TABLET, CHEWABLE ORAL at 17:22

## 2019-08-04 RX ADMIN — SODIUM CHLORIDE, SODIUM LACTATE, POTASSIUM CHLORIDE, AND CALCIUM CHLORIDE 125 ML/HR: 600; 310; 30; 20 INJECTION, SOLUTION INTRAVENOUS at 15:00

## 2019-08-04 RX ADMIN — Medication 10000 MILLI-UNITS/HR: at 23:41

## 2019-08-04 RX ADMIN — IBUPROFEN 800 MG: 800 TABLET ORAL at 23:41

## 2019-08-04 RX ADMIN — OXYTOCIN-SODIUM CHLORIDE 0.9% IV SOLN 30 UNIT/500ML 2 MILLI-UNITS/MIN: 30-0.9/5 SOLUTION at 19:15

## 2019-08-04 NOTE — PROGRESS NOTES
Labor Progress Note  Patient seen, fetal heart rate and contraction pattern evaluated, patient examined. Desires AROM and then epidural placement. Physical Exam:  Cervical Exam:  4 cm dilated  50% effaced -2 station    AROM clear  Presenting Part: cephalic  Uterine Activity: Frequency: Every 2-3 minutes  Fetal Heart Rate: Baseline: 140 per minute  Variability: moderate  Accelerations: yes  Decelerations: none    Assessment/Plan:  Reassuring fetal status, Continue plan for vaginal delivery   AROM now. Epidural bolus.   Cat 1 FHT    Guido Chau MD

## 2019-08-04 NOTE — PROGRESS NOTES
1305: Pt arrived to labor and delivery room 207 reporting contractions every ~3 minutes. Pt notified Dr. Dayami Lee prior to coming. Pt oriented to room and unit and POC discussed, pt verbalizing understanding. 1355: Dr. Dayami Lee at bedside discussing [address] with pt, discussing options depending on cervical exam. Pt states she would like to move forward with labor in hospital if at all possible. 1400: SVE per Dr. Dayami Lee, 4/50/-2. Pt would like to move forward with being admitted. MD discussing POC with pt, pt verbalizing understanding, opportunity for questions provided. VORB for IV placement and fluids, MD to return to bedside in about an hour. 1526: Spoke with Dr. Dayami Lee via phone, MD states he will be at bedside shortly. 1534: Pt states she would like epidural. Fluid bolus started. 1625: Dr. Todd Velazquez at bedside to discuss epidural placement with pt. Pt verbalizing understanding, opportunity for questions provided. Pt positioned for epidural placement. 1510: Spoke with Dr. Dayami Lee via telephone regarding pt complaints of heartburn. TORB for tums prn and 20 mg IV pepcid now. 1640: Epidural placement complete, pt repositioned in bed. Bed pads changed. 1650: Pt complaints of pain with contractions following epidural. Pt given bolus button to utilize now. 1700: Pt states pain is improved during contractions, pt denies any sensations of pressure, pt just states pain during contractions in lower abdomen that subsides between contractions. 1810: This RN reviewing prenatal labs and noted MD prescribed valtrex on 7/25/19, pt states she did not take prescription and that Dr. Dayami Lee was aware she did not  prescription. Dr. Dayami Lee called to make sure MD was aware pt stating she did not take valtrex as prescribed. MD states he is aware and that MD performed visual assessment for lesions when performing AROM and did not note any.      1840: Dr. Dayami Lee at bedside to discuss POC with pt, MD reviewing FHR and toco tracing. SVE per MD, 4/70/-2. MD discussing initiation of pitocin with pt, pt agrees and verbalizing understanding, opportunity for questions provided. VORB for pitocin per protocol. 1845: Bedside and Verbal shift change report given to FRANKO Mobley RN (oncoming nurse) by MIGUEL A Peterson (offgoing nurse). Report included the following information SBAR, Kardex, Procedure Summary, Intake/Output, MAR, Accordion and Recent Results.

## 2019-08-04 NOTE — ROUTINE PROCESS
1310: This RN calling Dr. Aaron Chavarria at this time, MD updated on pt status, SVE of 2-3/50/-3, and contraction pattern. MD VORB NST and possible repeat SVE in next hour. MD to arrive to assess pt in approximately 30 minutes. MD made aware of that pt reports not drinking water today, MD verbalizing agreement with plan for pt to PO hydrate. No further orders received at this time. 1314: Pt provided with pt water jug, pt educated on PO hydration, pt now drinking water at this time 1420: Dr. Aaron Chavarria updated on difficulty with IV placement. MD stating no rush. MD will reassess pt following IV placement 1448: ICU RN at pt bedside at this time to attempt IV placement per this RN request due to 2 failed attempts by this RN and GAVI Karimi, [place].  
 
5385: Dr. Aaron Chavarria at pt bedside at this time discussing plan of care with pt, pt verbalizing understanding to plan of care and plan for MD to perform SVE and AROM at this time 1552: SVE per MD, unchanged cervix of 4/50/-2 noted. AROM by MD, moderate amount of clear - light blood stained fluid noted by MD and this RN. Aurea care performed and pad changed. 5 MD discussing plan of care with pt. Pt epidural fluid bolus already initiated, plan per MD and pt is for pt to receive epidural shortly and MD will be over to reassess pt later this evening.

## 2019-08-04 NOTE — H&P
History & Physical    Name: Miguelito Sheridan MRN: [id number]  SSN: [id number]    YOB: 1985  Age: 30 y.o. Sex: female        Subjective:     Estimated Date of Delivery: 8/15/19  OB History        8    Para   5    Term   5            AB   2    Living   5       SAB   1    TAB        Ectopic        Molar        Multiple   0    Live Births   5                MsLudin Bell is admitted with pregnancy at 38w3d for early labor. Prenatal course was complicated by grand multiparity and pelvic pain. Please see prenatal records for details. Ctxs started last night and continued throughout the day getting stronger. Severe pelvic pain present. Past Medical History:   Diagnosis Date    Abnormal Pap smear     follow up normal    Abnormal Papanicolaou smear of cervix     Asthma     uses inhaler PRN    Genital herpes     Herpes simplex virus (HSV) infection      No past surgical history on file. Social History     Occupational History    Not on file   Tobacco Use    Smoking status: Never Smoker   Substance and Sexual Activity    Alcohol use: No    Drug use: No    Sexual activity: Yes     Partners: Male     Birth control/protection: None     No family history on file. Allergies   Allergen Reactions    Shellfish Derived Anaphylaxis     Prior to Admission medications    Medication Sig Start Date End Date Taking? Authorizing Provider   calcium carbonate (TUMS) 200 mg calcium (500 mg) chew Take 2 Tabs by mouth three (3) times daily. Indications: heartburn   Yes Provider, Historical   raNITIdine (ZANTAC) 150 mg tablet Take 150 mg by mouth two (2) times a day. Yes Provider, Historical   docusate sodium (COLACE) 100 mg capsule Take 100 mg by mouth two (2) times a day. Yes Provider, Historical        Review of Systems: A comprehensive review of systems was negative except for that written in the HPI.     Objective:     Vitals:  Vitals:    19 1314 19 1315 19 1322   BP:  121/74 Pulse:  95    Resp:  16    Temp:  98.1 °F (36.7 °C)    SpO2: 99%     Weight:   94.8 kg (209 lb)   Height:   5' 9\" (1.753 m)      Physical Exam:  Patient without distress. Perineum: blood absent, amniotic fluid absent  Cervical Exam: 4 cm dilated  50% effaced  -2 station    No HSV lesions present  Presenting Part: cephalic  Lower Extremities:  - Edema 1+  Membranes:  Intact  Fetal Heart Rate: Baseline: 140 per minute  Variability: moderate  Accelerations: yes  Decelerations: none  Uterine contractions: regular, every 3 minutes    Prenatal Labs:   Lab Results   Component Value Date/Time    Rubella, External immune 09/20/2016    GrBStrep, External negative 02/28/2017    HBsAg, External negative 09/20/2016    HIV, External negative 09/20/2016    RPR, External nonreactive 09/20/2016    Gonorrhea, External negative 08/30/2016    Chlamydia, External negative 08/30/2016    ABO,Rh O positive 09/20/2016       Assessment/Plan:     Plan: Admit for Labor  Progressing normally. Group B Strep was negative. Will consider AROM after IV is in place. She is not anemic.      Signed By:  Tatyana Poon MD     August 4, 2019

## 2019-08-04 NOTE — PROGRESS NOTES
Labor Progress Note  Patient seen, fetal heart rate and contraction pattern evaluated, patient examined. Comfortable with epidural.    Patient Vitals for the past 2 hrs:   BP Temp Pulse Resp SpO2   08/04/19 1802     100 %   08/04/19 1757     100 %   08/04/19 1752     100 %   08/04/19 1742     100 %   08/04/19 1737     100 %   08/04/19 1736 121/80  71     08/04/19 1732     100 %   08/04/19 1727     100 %   08/04/19 1722     100 %   08/04/19 1720 120/74  77     08/04/19 1717     100 %   08/04/19 1712     100 %   08/04/19 1707 116/65  86  100 %   08/04/19 1705 116/67 98.8 °F (37.1 °C) 86 16    08/04/19 1702     100 %   08/04/19 1657     100 %   08/04/19 1652     100 %   08/04/19 1649 113/72  76     08/04/19 1647     100 %   08/04/19 1646 111/70  68         Physical Exam:  Cervical Exam:  4 cm dilated  70% effaced  -2 station    Presenting Part: cephalic  Uterine Activity: Frequency: Every 3-4 minutes  Fetal Heart Rate: Baseline: 135 per minute  Variability: moderate  Accelerations: yes  Decelerations: none    Assessment/Plan:  Reassuring fetal status, Continue plan for vaginal delivery   Epidural working  Start augmentation with Pitocin.     Tre Sanchez MD

## 2019-08-04 NOTE — ANESTHESIA PROCEDURE NOTES
Epidural Block    Start time: 8/4/2019 4:29 PM  End time: 8/4/2019 4:39 PM  Performed by: Minerva Hooks MD  Authorized by: Minerva Hooks MD     Pre-Procedure  Indication: labor epidural    Preanesthetic Checklist: patient identified, risks and benefits discussed, anesthesia consent, timeout performed and anesthesia consent    Timeout Time: 16:29        Epidural:   Patient position:  Seated  Prep region:  Lumbar  Prep: Betadine    Location:  L3-4    Needle and Epidural Catheter:   Needle Type:  Tuohy  Needle Gauge:  17 G  Injection Technique:  Loss of resistance using air  Attempts:  1  Catheter Size:  18 G  Events: no paresthesia and negative aspiration test    Test Dose:  Lidocaine 1.5% w/ epi and negative    Assessment:   Catheter Secured:  Tegaderm and tape  Insertion:  Uncomplicated  Patient tolerance:  Patient tolerated the procedure well with no immediate complications

## 2019-08-04 NOTE — PROGRESS NOTES
184:Bedside and Verbal shift change report given to FRANKO Kraft RN (oncoming nurse) by MIGUEL A Tee RN (offgoing nurse). Report included the following information SBAR, Kardex, Procedure Summary, Intake/Output and MAR.     : Shift assessment complete. Pt oriented to room and reviewed plan of care. No concerns noted at this time. Pt does state that she is very nauseated and requests meds for the nausea at this time. Pt repositioned on her lateral right with peanut ball. : Pitocin started per protocol. FHR strip reactive. : Zofran 4mg given per order. Pt tolerated well. : Pt repositioned on her lateral left with peanut ball    : Pitocin increased  To a rate of 4 per protocol. : PT repositioned on her lateral right with leg in running man position. : Dr. Dru Gray at bedside assessing pt status and monitoring FHR tracing. SVE preformed by MD. Pt noted to be 6/80/-2. Pad changed. Pt repositioned on her left lateral hip with peanut ball in place. : Pt straight cath using sterile technique. 300ml urine noted. Pt states she is feeling pressure in bottom. : SVE preformed by Sylvia Parker RN. Pt noted to be 10/100/+2    2220: MD called for delivery    222: MD arrived for delivery    2227: Pt placed in lithotomy position with legs in stirrups. PT educated on how to push. MD at perineum. Pushing initiated. 222: MD at perineum.  of head. MD assessing for nuchal. No nuchal noted.  of viable male infant. Infant placed on maternal abdomen. Infant dried and stimulated by Lawrence Yu RN     2230: Cord clamped and cut. Cord blood obtained and sent to lab per protocol. 223:  of intact placenta. Placenta inspected and discarded per policy. MD remains at perineum preforming fundal massage and assessing for lacerations. No lacerations noted. Fundus noted to be firm and -1 umbilicus. Delivery QBL 100ml     2240: Initial fundal assessment preformed.  Fundus noted to be firm. [id number]: Two hr postpartum assessment complete    Total QBL 210ml    0125:TRANSFER - OUT REPORT:    Verbal report given to MOODY Killian and CHRISTIAN Girard RN(name) on April Medlock  being transferred to MIU(unit) for routine progression of care       Report consisted of patients Situation, Background, Assessment and   Recommendations(SBAR). Information from the following report(s) SBAR, Kardex, Procedure Summary, Intake/Output and MAR was reviewed with the receiving nurse. Lines:   Peripheral IV 08/04/19 Anterior;Right Forearm (Active)   Site Assessment Clean, dry, & intact 8/4/2019  7:34 PM   Phlebitis Assessment 0 8/4/2019  7:34 PM   Infiltration Assessment 0 8/4/2019  7:34 PM   Dressing Status Clean, dry, & intact 8/4/2019  7:34 PM   Dressing Type Transparent;Tape 8/4/2019  7:34 PM   Hub Color/Line Status Pink 8/4/2019  7:34 PM   Action Taken Blood drawn 8/4/2019  2:56 PM   Alcohol Cap Used Yes 8/4/2019  2:56 PM        Opportunity for questions and clarification was provided.       Patient transported with:   Registered Nurse

## 2019-08-05 PROCEDURE — 74011250637 HC RX REV CODE- 250/637: Performed by: OBSTETRICS & GYNECOLOGY

## 2019-08-05 PROCEDURE — 65270000029 HC RM PRIVATE

## 2019-08-05 RX ORDER — KETOROLAC TROMETHAMINE 30 MG/ML
30 INJECTION, SOLUTION INTRAMUSCULAR; INTRAVENOUS
Status: DISCONTINUED | OUTPATIENT
Start: 2019-08-05 | End: 2019-08-05

## 2019-08-05 RX ORDER — KETOROLAC TROMETHAMINE 30 MG/ML
30 INJECTION, SOLUTION INTRAMUSCULAR; INTRAVENOUS
Status: DISCONTINUED | OUTPATIENT
Start: 2019-08-05 | End: 2019-08-06 | Stop reason: HOSPADM

## 2019-08-05 RX ADMIN — IBUPROFEN 800 MG: 800 TABLET ORAL at 23:14

## 2019-08-05 RX ADMIN — Medication 1 TABLET: at 09:00

## 2019-08-05 RX ADMIN — ACETAMINOPHEN 650 MG: 325 TABLET ORAL at 22:17

## 2019-08-05 RX ADMIN — IBUPROFEN 800 MG: 800 TABLET ORAL at 15:16

## 2019-08-05 RX ADMIN — IBUPROFEN 800 MG: 800 TABLET ORAL at 07:50

## 2019-08-05 RX ADMIN — DOCUSATE SODIUM 100 MG: 100 CAPSULE, LIQUID FILLED ORAL at 09:00

## 2019-08-05 RX ADMIN — HYDROCODONE BITARTRATE AND ACETAMINOPHEN 1 TABLET: 5; 325 TABLET ORAL at 13:22

## 2019-08-05 NOTE — PROGRESS NOTES
Labor Progress Note  Patient seen, fetal heart rate and contraction pattern evaluated, patient examined. Feels some pressure. Physical Exam:  Cervical Exam:  6 cm dilated  80% effaced  -2 station    Presenting Part: cephalic  Uterine Activity: Frequency: Every 2-3 minutes  Pitocin 4  Fetal Heart Rate: Baseline: 130 per minute  Variability: moderate  Accelerations: yes  Decelerations: early    Assessment/Plan:  Reassuring fetal status, Continue plan for vaginal delivery   Continue augmentation.     Durrell Blizzard, MD

## 2019-08-05 NOTE — L&D DELIVERY NOTE
Delivery Summary    Patient: Miguelito Sheridan MRN: [id number]  SSN: [id number]    YOB: 1985  Age: 30 y.o.   Sex: female        Labor Events:    Labor: No     Steroids: None    Cervical Ripening Date/Time:         Cervical Ripening Type: None    Antibiotics During Labor: No    Rupture Identifier:       Rupture Date/Time: 2019  3:42 PM    Rupture Type: AROM    Amniotic Fluid Volume:       Amniotic Fluid Description: Clear;Blood stained     Amniotic Fluid Odor:       Induction: None         Induction Date/Time:          Indications for Induction:       Augmentation: Oxytocin;AROM    Augmentation Date/Time: 2019 3:40 PM    Indications for Augmentation: Ineffective Contraction Pattern    Labor complications: None         Additional complications:         Delivery Events:  Indications For Episiotomy:      Episiotomy: None    Perineal Laceration(s): None    Repaired:      Periurethral Laceration Location:       Repaired:      Labial Laceration Location:      Repaired:      Sulcal Laceration Location:      Repaired:      Vaginal Laceration Location:      Repaired:      Cervical Laceration Location:      Repaired:      Repair Suture: None    Number of Repair Packets:      Estimated Blood Loss (ml): 100 ml     Information for the patient's newbornLily Marlon Pittman April [[id number]]     Delivery Summary - Baby    Delivery Date: 2019   Delivery Time: 10:28 PM   Delivery Type: Vaginal, Spontaneous  Sex:  male  Gestational Age: 38w3d  Delivery Clinician:  Jennifer Trevizo  Living?: Living   Delivery Location: L&D 207           APGARS  One minute Five minutes Ten minutes   Skin Color: 1    1       Heart Rate: 2   2         Reflex Irritability: 2   2         Muscle Tone: 2   2       Respiration: 2   2         Total: 9   9           Presentation: Vertex  Position: Right Occiput Anterior  Resuscitation Method:  Tactile Stimulation;Suctioning-bulb     Meconium Stained: None    Cord Information: 3 Vessels  Complications: None  Cord around:    Delayed cord clamping? Yes  Cord clamped date/time:2019 10:30 PM  Disposition of Cord Blood: Lab    Blood Gases Sent?: No      Placenta:  Date/Time: 2019 10:32 PM  Removal: Spontaneous      Appearance: Normal     [place] Measurements:  Birth Weight:      Birth Length:     Head Circumference:       Chest Circumference:      Abdominal Girth:       Other Providers:   MIGUEL ÁNGEL CHEN;JOAQUIN WINSTON;JORGE AZAR COURTNEY A Obstetrician;Primary Nurse;Primary [place] Nurse;Charge Nurse       Cord Blood Results:  Information for the patient's :  Stacy Porter, Male April [[id number]]   No results found for: Dairl Room, PCTDIG, BILI, ABORHEXT, ABORH

## 2019-08-05 NOTE — PROGRESS NOTES
Post-Partum Day Number 1 Progress Note    April [facility]   30 y.o. Information for the patient's :  Nick Colbert, Male April [[id number]]   Vaginal, Spontaneous     Patient doing well without significant complaint. Voiding without difficulty, normal lochia. Cramps with breast feeding. Vitals:  Visit Vitals  /76 (BP 1 Location: Right arm, BP Patient Position: At rest)   Pulse 72   Temp 98.4 °F (36.9 °C)   Resp 16   Ht 5' 9\" (1.753 m)   Wt 94.8 kg (209 lb)   SpO2 100%   Breastfeeding? Unknown   BMI 30.86 kg/m²     Temp (24hrs), Av.1 °F (36.7 °C), Min:97.5 °F (36.4 °C), Max:98.8 °F (37.1 °C)    Exam:   Patient without distress. FF @ U-2 NT                LE NT w/o edema    Labs:     Lab Results   Component Value Date/Time    WBC 11.9 (H) 2019 02:40 PM    HGB 11.7 2019 02:40 PM    HCT 36.1 2019 02:40 PM    PLATELET 227  02:40 PM       Recent Results (from the past 24 hour(s))   CBC W/O DIFF    Collection Time: 19  2:40 PM   Result Value Ref Range    WBC 11.9 (H) 3.6 - 11.0 K/uL    RBC 4.55 3.80 - 5.20 M/uL    HGB 11.7 11.5 - 16.0 g/dL    HCT 36.1 35.0 - 47.0 %    MCV 79.3 (L) 80.0 - 99.0 FL    MCH 25.7 (L) 26.0 - 34.0 PG    MCHC 32.4 30.0 - 36.5 g/dL    RDW 14.3 11.5 - 14.5 %    PLATELET 335 426 - 080 K/uL    MPV 11.1 8.9 - 12.9 FL    NRBC 0.0 0  WBC    ABSOLUTE NRBC 0.00 0.00 - 0.01 K/uL   TYPE & SCREEN    Collection Time: 19  2:48 PM   Result Value Ref Range    Crossmatch Expiration 2019     ABO/Rh(D) O POSITIVE     Antibody screen NEG          Assessment: PPD 1 s/p , stable; O+/RI  Plan:  1. Continue routine postpartum care  2.  Circ today      Toño Brunner MD  2019

## 2019-08-05 NOTE — DISCHARGE SUMMARY
Patient ID:  Arlet Erickson  [id number]  30 y.o.  1985    Admit Date: 2019    Discharge Date: 2019     Admitting Physician: Alana Hewitt MD  Attending Physician: Sinan Srinivasan MD    Admission Diagnoses: 1. [address] multiparity in labor and delivery [O09.40]  2. Normal labor [O80, Z37.9]    Procedures for this admission:     Hospital Course: Uncomplicated    Discharge Diagnoses: Same as above with  producing a viable infant. Information for the patient's :  Marlon Larsen April [[id number]]         Discharge Disposition:  Home    Discharge Condition:  Good    Additional Diagnoses: [address] multiparity . Maternal Labs:   Lab Results   Component Value Date/Time    HBsAg, External Negative 2019    HIV, External Negative 2019    Rubella, External Immune 2019    RPR, External Non-Reactive 2019    GrBStrep, External Negative 2019       Cord Blood Results:   Information for the patient's :  Marlon Larsen April [[id number]]   No results found for: PCTABR, ABORH, PCTDIG, BILI, ABORH, ABORHEXT          History of Present Illness:   OB History        8    Para   5    Term   5            AB   2    Living   5       SAB   1    TAB        Ectopic        Molar        Multiple   0    Live Births   5              Admitted for active labor. Hospital Course:   Patient was admitted as above and delivered via . Please the chart for details. The postpartum course was unremarkable. She was deemed stable for discharge home on day 2.     Follow up with Dr. Alana Hewitt MD in 6 weeks        Signed:  Alana Hewitt MD  2019  10:50 PM

## 2019-08-05 NOTE — PROGRESS NOTES
08/05/19 3:18 PM  CM met with CHARBEL to complete initial assessment and to begin discharge planning. Demographics were reviewed and confirmed. CHARBEL and her /FOB Carlos Marr ([phone]) live together with their children, ages 3, 3, 8, 6, and 12. Both work and will have adequate time away from work. Supports available to assist as needed and include family and friends. CHARBEL is breastfeeding and has a pump to use at home. [address] [place] [address] will provide medical follow up for the baby. Patient has car seat, crib, clothing, and other necessary supplies. Denied need for Medicaid services and WIC at this time, but should this change, CHARBEL confirmed that she knows how to access these services. Care Management Interventions  PCP Verified by CM:  Yes  Mode of Transport at Discharge: Self  Transition of Care Consult (CM Consult): Discharge Planning  Current Support Network: Lives with Spouse, Family Lives Nearby  Confirm Follow Up Transport: Family  Plan discussed with Pt/Family/Caregiver: Yes  Discharge Location  Discharge Placement: Home with family assistance  MONTEZ Riggs

## 2019-08-05 NOTE — LACTATION NOTE
This note was copied from a baby's chart. Assisted mother with BF on right breast.  Mother states she has difficulty with latching on the right side. Baby drowsy, woken to feed. Baby will open and hold nipple in mouth, no such achieved. Mother able to express drops to baby. BF basics reviewed. Discussed with mother her plan for feeding. Reviewed the benefits of exclusive breast milk feeding during the hospital stay. Informed her of the risks of using formula to supplement in the first few days of life as well as the benefits of successful breast milk feeding; referred her to the Breastfeeding booklet about this information. She acknowledges understanding of information reviewed and states that it is her plan to breastfeed her infant. Will support her choice and offer additional information as needed. Reviewed breastfeeding basics:  How milk is made and normal  breastfeeding behaviors discussed. Supply and demand,  stomach size, early feeding cues, skin to skin bonding with comfortable positioning and baby led latch-on reviewed. How to identify signs of successful breastfeeding sessions reviewed; education on assymetrical latch, signs of effective latching vs shallow, in-effective latching, normal  feeding frequency and duration and expected infant output discussed. Normal course of breastfeeding discussed including the AAP's recommendation that children receive exclusive breast milk feedings for the first six months of life with breast milk feedings to continue through the first year of life and/or beyond as complimentary table foods are added. Breastfeeding Booklet and Warm line information provided with discussion. Discussed typical  weight loss and the importance of pediatrician appointment within 24-48 hours of discharge, at 2 weeks of life and normalcy of requesting pediatric weight checks as needed in between visits.     Hand Expression Education:  Mom taught how to manually hand express her colostrum. Emphasized the importance of providing infant with valuable colostrum as infant rests skin to skin at breast.  Aware to avoid extended periods of non-feeding. Aware to offer 10-20+ drops of colostrum every 2-3 hours until infant is latching and nursing effectively. Taught the rationale behind this low tech but highly effective evidence based practice. Pt will successfully establish breastfeeding by feeding in response to early feeding cues or wake every 3h, will obtain deep latch, and will keep log of feedings/output. Taught to BF at hunger cues and or q 2-3 hrs and to offer 10-20 drops of hand expressed colostrum at any non-feeds. Breast Assessment  Left Breast: Medium  Left Nipple: Everted, Intact  Right Breast: Medium  Right Nipple: Everted, Intact  Breast- Feeding Assessment  Attends Breast-Feeding Classes: No  Breast-Feeding Experience: Yes(6th child but 2nd to breastfeed.   BF for 7 months)  Breast Trauma/Surgery: No  Type/Quality: Good  Lactation Consultant Visits  Breast-Feedings: Good   Mother/Infant Observation  Mother Observation: Breast comfortable, Recognizes feeding cues  Infant Observation: Rhythmic suck, Relaxed after feeding, Opens mouth, Lips flanged, upper, Lips flanged, lower, Latches nipple and aereolae, Feeding cues  LATCH Documentation  Latch: Grasps breast, tongue down, lips flanged, rhythmic sucking  Audible Swallowing: A few with stimulation  Type of Nipple: Everted (after stimulation)  Comfort (Breast/Nipple): Soft/non-tender  Hold (Positioning): Full assist, teach one side, mother does other, staff holds  [facility] Score: 8

## 2019-08-05 NOTE — PROGRESS NOTES
Bedside and Verbal shift change report given to MOODY Pelletier RN & CHRISTIAN Carlton RN (oncoming nurse) by Keyla Bradley RN & KATHERIN Marinelli RN (offgoing nurse). Report included the following information SBAR, Kardex, Intake/Output and MAR.

## 2019-08-06 VITALS
RESPIRATION RATE: 16 BRPM | DIASTOLIC BLOOD PRESSURE: 75 MMHG | SYSTOLIC BLOOD PRESSURE: 126 MMHG | HEART RATE: 74 BPM | OXYGEN SATURATION: 100 % | WEIGHT: 209 LBS | TEMPERATURE: 98.1 F | BODY MASS INDEX: 30.96 KG/M2 | HEIGHT: 69 IN

## 2019-08-06 PROCEDURE — 74011250637 HC RX REV CODE- 250/637: Performed by: OBSTETRICS & GYNECOLOGY

## 2019-08-06 RX ORDER — IBUPROFEN 800 MG/1
800 TABLET ORAL EVERY 8 HOURS
Qty: 40 TAB | Refills: 0 | Status: SHIPPED | OUTPATIENT
Start: 2019-08-06 | End: 2022-04-05

## 2019-08-06 RX ADMIN — DOCUSATE SODIUM 100 MG: 100 CAPSULE, LIQUID FILLED ORAL at 09:18

## 2019-08-06 RX ADMIN — IBUPROFEN 800 MG: 800 TABLET ORAL at 07:19

## 2019-08-06 RX ADMIN — ACETAMINOPHEN 650 MG: 325 TABLET ORAL at 10:44

## 2019-08-06 RX ADMIN — Medication 1 TABLET: at 09:18

## 2019-08-06 NOTE — PROGRESS NOTES
[facility]  [address]  [facility] [address]  Phone: [phone]           Patient Discharge Instructions     Our goal is to set you up for success. This packet includes information on your condition, medications, and your home care. It will help you to care for yourself so you don't get sicker and need to go back to the hospital.     Please ask your nurse if you have any questions.        There are many details to remember when preparing to leave the hospital. Here is what you will need to do:    1. Take your medicine. If you are prescribed medications, review your Medication List in the following pages. You may have new medications to  at the pharmacy and others that you'll need to stop taking. Review the instructions for how and when to take your medications. Talk with your doctor or nurses if you are unsure of what to do.     2. Go to your follow-up appointments. Specific follow-up information is listed in the following pages. Your may be contacted by a transition nurse or clinical provider about future appointments. Be sure we have all of the phone numbers to reach you, if needed. Please contact your provider's office if you are unable to make an appointment.     3. Watch for warning signs. Your doctor or nurse will give you detailed warning signs to watch for and when to call for assistance. These instructions may also include educational information about your condition. If you experience any of warning signs to your health, call your doctor.               [facility]  Unless otherwise directed by your provider, please contact [facility], our nurse care line that is available for 24/7 assistance.     [phone] (toll-free)    Registered nurses in the [facility] provide clinical advisement, health education, appointment booking, and other advisory services.                    ** Verify the list of medication(s) below is accurate and up  Post-Partum Day Number 2 Progress Note    Patient doing well post-partum without significant complaints. Voiding without difficulty, normal lochia. Cramps are tolerable with Motrin and Tylenol. Breast feeding. Vitals:    Patient Vitals for the past 24 hrs:   BP Temp Pulse Resp   19 0724 (!) 128/93 98.1 °F (36.7 °C) 74 16   19 2252 119/65 98.8 °F (37.1 °C) 79 16   19 1650 120/78 98.4 °F (36.9 °C) 80 16     Temp (24hrs), Av.4 °F (36.9 °C), Min:98.1 °F (36.7 °C), Max:98.8 °F (37.1 °C)      Vital signs stable, afebrile. Exam:  Patient without distress. Abdomen soft, fundus firm, nontender               Lower extremities, ADELAIDA nontender w/o edema    Labs: No results found for this or any previous visit (from the past 24 hour(s)). Assessment and Plan:  PPD 2, stable, routine care; O+/RI  1. Discharge today-instructions reviewed. to date. Carry this with you in case of emergency. If your medications have changed, please notify your healthcare provider.             Medication List      START taking these medications        Additional Info                      ferrous sulfate 325 (65 FE) MG EC tablet   Refills:  0   Dose:  325 mg    Last time this was given:  325 mg on 2/16/2017  1:12 PM   Instructions:  Take 1 tablet (325 mg total) by mouth once daily.     Begin Date    AM    Noon    PM    Bedtime         CONTINUE taking these medications        Additional Info                      medroxyPROGESTERone 150 mg/mL Syrg   Commonly known as:  DEPO-PROVERA   Quantity:  1 mL   Refills:  0   Dose:  150 mg    Instructions:  Inject 1 mL (150 mg total) into the muscle once.     Begin Date    AM    Noon    PM    Bedtime       naproxen sodium 550 MG tablet   Commonly known as:  ANAPROX DS   Quantity:  20 tablet   Refills:  0    Instructions:  DO NOT TAKE OTHER ANTI-INFLAMMATORY AGENTS SUCH AS ASPIRIN, IBUPROPHEN, PAMPRIN, ETC. AT THE SAME TIME     Begin Date    AM    Noon    PM    Bedtime            Where to Get Your Medications      You can get these medications from any pharmacy     You don't need a prescription for these medications     ferrous sulfate 325 (65 FE) MG EC tablet                  Please bring to all follow up appointments:    1. A copy of your discharge instructions.  2. All medicines you are currently taking in their original bottles.  3. Identification and insurance card.    Please arrive 15 minutes ahead of scheduled appointment time.    Please call 24 hours in advance if you must reschedule your appointment and/or time.        Your Scheduled Appointments     Feb 24, 2017  9:00 AM CST   Consult with Lisa Aguayo MD   [facility] - Cardiology ([facility] )    [address]  [place] [address] [phone]              Follow-up Information     Follow up with Lisa Aguayo MD On 2/24/2017.    Specialty:  Cardiology  "   Why:  09:00 appointment    Contact information:    [address]  [facility] [address]  [phone]          Discharge Instructions     Future Orders    Activity as tolerated     Call MD for:  persistent dizziness, light-headedness, or visual disturbances     Call MD for:  persistent nausea and vomiting or diarrhea     Call MD for:  severe persistent headache     Call MD for:  severe uncontrolled pain     Call MD for:  temperature >100.4     Diet general     Questions:    Total calories:      Fat restriction, if any:      Protein restriction, if any:      Na restriction, if any:      Fluid restriction:      Additional restrictions:          Primary Diagnosis     Your primary diagnosis was:  Chest Pain On Exertion      Admission Information     Date & Time Provider Department CSN    2/15/2017  6:46 PM Soco Lopez MD [facility] [id number]      Care Providers     Provider Role Specialty Primary office phone    Soco Lopez MD Attending Provider Hospitalist [phone]    Alix Bennett MD Consulting Physician  Cardiology  [phone]    Soco Lopez MD Team Attending  Hospitalist [phone]    Pantera Barron MD Team Attending  Hospitalist [phone]      Your Vitals Were     BP Pulse Temp Resp Height Weight    121/68 79 97.5 °F (36.4 °C) 20 5' 3" (1.6 m) 55.3 kg (122 lb)    SpO2 BMI             95% 21.61 kg/m2         Recent Lab Values     No lab values to display.      Allergies as of 2/16/2017        Reactions    Bananas [Banana] Itching    Peanut Butter Flavor       Advance Directives     An advance directive is a document which, in the event you are no longer able to make decisions for yourself, tells your healthcare team what kind of treatment you do or do not want to receive, or who you would like to make those decisions for you.  If you do not currently have an advance directive, [facility] encourages you to create one.  For more information call:  [phone]), [phone] ([phone]),  or log on to [email or web address].        Language Assistance Services     ATTENTION: Language assistance services are available, free of charge. Please call [phone].      ATENCIÓN: Si habla español, tiene a santos disposición servicios gratuitos de asistencia lingüística. Llame al [phone].     CHÚ Ý: N?u b?n nói Ti?ng Vi?t, có các d?ch v? h? tr? ngôn ng? mi?n phí dành cho b?n. G?i s? [phone].         [facility] complies with applicable Federal civil rights laws and does not discriminate on the basis of race, color, national origin, age, disability, or sex.

## 2019-08-06 NOTE — ROUTINE PROCESS
Bedside and Verbal shift change report given to Genesis Nuñez RN and Disha Damian RN (oncoming nurse) by Silvia Oneal RN (offgoing nurse). Report included the following information SBAR, Intake/Output, MAR and Recent Results.

## 2019-08-06 NOTE — LACTATION NOTE
This note was copied from a baby's chart. Mother and baby are for discharge today. Mother states baby has been breastfeeding well. LC reviewed the following:    Reviewed breastfeeding basics:  Supply and demand, breastfeed baby 8-12 times in 24 hr., feed baby on demand,  stomach size, early  Feeding cues, skin to skin, positioning and baby led latch-on, assymetrical latch with signs of good, deep latch vs shallow, feeding frequency and duration, and log sheet for tracking infant feedings and output. Breastfeeding Booklet and Warm line information given. Discussed typical  weight loss and the importance of infant weight checks with pediatrician 1-2 post discharge. Engorgement Care Guidelines:  Reviewed how milk is made and normal phases of milk production. Taught care of engorged breasts - frequent breastfeeding encouraged, cool packs and motrin as tolerated. Anticipatory guidance shared. Care for sore/tender nipples discussed:  ways to improve positioning and latch practiced and discussed, hand express colostrum after feedings and let air dry, light application of lanolin, hydrogel pads, seek comfortable laid back feeding position, start feedings on least sore side first.    Discussed eating a healthy diet. Instructed mother to eat a variety of foods in order to get a well balanced diet. She should consume an extra 500 calories per day (more than her non-pregnant requirement.) These extra calories will help provide energy needed for optimal breast milk production. Mother also encouraged to \"drink to thirst\" and it is recommended that she drink fluids such as water, fruit/vegetable juice. Nutritious snacks should be available so that she can eat throughout the day to help satisfy her hunger and maintain a good milk supply.     Pt will successfully establish breastfeeding by feeding in response to early feeding cues   or wake every 3h, will obtain deep latch, and will keep log of feedings/output. Taught to BF at hunger cues and or q 2-3 hrs and to offer 10-20 drops of hand expressed colostrum at any non-feeds. Breast Assessment  Left Breast: Medium  Left Nipple: Everted, Intact  Right Breast: Medium  Right Nipple: Everted, Intact  Breast- Feeding Assessment  Attends Breast-Feeding Classes: No  Breast-Feeding Experience: Yes  Breast Trauma/Surgery: No  Type/Quality: Good  Lactation Consultant Visits  Breast-Feedings: Good   Mother/Infant Observation  Mother Observation: Alignment, Recognizes feeding cues, Breast comfortable, Close hold, Cramps  Infant Observation: Audible swallows, Lips flanged, lower, Lips flanged, upper, Feeding cues, Opens mouth, Relaxed after feeding, Latches nipple and aereolae, Rhythmic suck  LATCH Documentation  Latch: Grasps breast, tongue down, lips flanged, rhythmic sucking  Audible Swallowing: A few with stimulation  Type of Nipple: Everted (after stimulation)  Comfort (Breast/Nipple): Soft/non-tender  Hold (Positioning): No assist from staff, mother able to position/hold infant  LATCH Score: 9      Chart shows numerous feedings, void, stool WNL. Discussed importance of monitoring outputs and feedings on first week of life. Discussed ways to tell if baby is  getting enough breast milk, ie  voids and stools, change in color of stool, and return to birth wt within 2 weeks. Follow up with pediatrician visit for weight check in 1-2 days (per AAP guidelines.)  Encouraged to call Warm Line  [phone]  for any questions/problems that arise.  Mother also given breastfeeding support group dates and times for any future needs

## 2019-08-06 NOTE — PROGRESS NOTES
Pt off unit in stable condition via wheelchair with volunteers for discharge home per Dr. Jesslyn Cranker. Pt is aware to follow up in 4 weeks. Prescriptions given to pt. Pt denies any HA, dizziness, N/V, or pain at this time. Infant in car seat and discharged with mother.

## 2019-08-06 NOTE — DISCHARGE INSTRUCTIONS
POSTPARTUM DISCHARGE INSTRUCTIONS       Name:  Grace Stephens  YOB: 1985  Admission Diagnosis:  [address] multiparity in labor and delivery [O09.40]  Normal labor [O80, Z37.9]     Discharge Diagnosis:    Problem List as of 8/6/2019 Never Reviewed          Codes Class Noted - Resolved    [address] multiparity in labor and delivery ICD-10-CM: O09.40  ICD-9-CM: 659.40  8/4/2019 - Present        Normal labor ICD-10-CM: O80, Z37.9  ICD-9-CM: 923  3/25/2017 - Present        Pregnancy ICD-10-CM: Z34.90  ICD-9-CM: V22.2  8/20/2014 - Present            Attending Physician:  Rocio Lomeli MD    Delivery Type:  Vaginal Childbirth: What To Expect At Home    Your Recovery: Your body will slowly heal in the next few weeks. It is easy to get too tired and overwhelmed during the first weeks after your baby is born. Changes in your hormones can shift your mood without warning. You may find it hard to meet the extra demands on your energy and time. Take it easy on yourself. Follow-up care is a key part of your treatment and safety. Be sure to make and go to all appointments, and call your doctor if you are having problems. It's also a good idea to know your test results and keep a list of the medicines you take. How can you care for yourself at home? Vaginal bleeding and cramps  · After delivery, you will have a bloody discharge from the vagina. This will turn pink within a week and then white or yellow after about 10 days. It may last for 2 to 4 weeks or longer, until the uterus has healed. Use pads instead of tampons until you stop bleeding. · Do not worry if you pass some blood clots, as long as they are smaller than a golf ball. If you have a tear or stitches in your vaginal area, change the pad at least every 4 hours to prevent soreness and infection. · You may have cramps for the first few days after childbirth. These are normal and occur as the uterus shrinks to normal size.  Take an over-the-counter pain medicine, such as acetaminophen (Tylenol), ibuprofen (Advil, Motrin), or naproxen (Aleve), for cramps. Read and follow all instructions on the label. Do not take aspirin, because it can cause more bleeding. Do not take acetaminophen (Tylenol) and other acetaminophen containing medications (i.e. Percocet) at the same time. Breast fullness  · Your breasts may overfill (engorge) in the first few days after delivery. To help milk flow and to relieve pain, warm your breasts in the shower or by using warm, moist towels before nursing. · If you are not nursing, do not put warmth on your breasts or touch your breasts. Wear a tight bra or sports bra and use ice until the fullness goes away. This usually takes 2 to 3 days. · Put ice or a cold pack on your breast after nursing to reduce swelling and pain. Put a thin cloth between the ice and your skin. Activity  · Eat a balanced diet. Do not try to lose weight by cutting calories. Keep taking your prenatal vitamins, or take a multivitamin. · Get as much rest as you can. Try to take naps when your baby sleeps during the day. · Get some exercise every day. But do not do any heavy exercise until your doctor says it is okay. · Wait until you are healed (about 4 to 6 weeks) before you have sexual intercourse. Your doctor will tell you when it is okay to have sex. · Talk to your doctor about birth control. You can get pregnant even before your period returns. Also, you can get pregnant while you are breast-feeding. Mental Health  · Many women get the \"baby blues\" during the first few days after childbirth. You may lose sleep, feel irritable, and cry easily. You may feel happy one minute and sad the next. Hormone changes are one cause of these emotional changes. Also, the demands of a new baby, along with visits from relatives or other family needs, add to a mother's stress. The \"baby blues\" often peak around the fourth day.  Then they ease up in less than 2 weeks.  · If your moodiness or anxiety lasts for more than 2 weeks, or if you feel like life is not worth living, you may have postpartum depression. This is different for each mother. Some mothers with serious depression may worry intensely about their infant's well-being. Others may feel distant from their child. Some mothers might even feel that they might harm their baby. A mother may have signs of paranoia, wondering if someone is watching her. · With all the changes in your life, you may not know if you are depressed. Pregnancy sometimes causes changes in how you feel that are similar to the symptoms of depression. · Symptoms of depression include:  · Feeling sad or hopeless and losing interest in daily activities. These are the most common symptoms of depression. · Sleeping too much or not enough. · Feeling tired. You may feel as if you have no energy. · Eating too much or too little. · [employer] ([employer]) offers a Warm line; Chat with the Expert phone sessions; Information and Articles about Pregnancy and Postpartum Mood Disorders; Comprehensive List of Free Support Groups; Knowledgeable local coordinators who will offer support, information, and resources; Guide to Resources on [employer]; Calendar of events in the  mood disorders community; Latest News and Research; and [facility] [address] for [employer]. Remember - You are not alone; You are not to blame; With help, you will be well. 4-266-128-PPD(6946). WWW. POSTPARTUM. NET   · Writing or talking about death, such as writing suicide notes or talking about guns, knives, or pills. Keep the numbers for these national suicide hotlines: 8-983-783-TALK (7-471.580.9266) and 5-156-PPMHPUE (3-155.987.1801). If you or someone you know talks about suicide or feeling hopeless, get help right away. Constipation and Hemorrhoids  · Drink plenty of fluids, enough so that your urine is light yellow or clear like water.  If you have kidney, heart, or liver disease and have to limit fluids, talk with your doctor before you increase the amount of fluids you drink. · Eat plenty of fiber each day. Have a bran muffin or bran cereal for breakfast, and try eating a piece of fruit for a mid-afternoon snack. · For painful, itchy hemorrhoids, put ice or a cold pack on the area several times a day for 10 minutes at a time. Follow this by putting a warm compress on the area for another 10 to 20 minutes or by sitting in a shallow, warm bath. When should you call for help? Call 911 anytime you think you may need emergency care. For example, call if:  · You are thinking of hurting yourself, your baby, or anyone else. · You passed out (lost consciousness). · You have symptoms of a blood clot in your lung (called a pulmonary embolism). These may include:    · Sudden chest pain. · Trouble breathing. · Coughing up blood. Call your doctor now or seek immediate medical care if:  · You have severe vaginal bleeding. · You are soaking through a pad each hour for 2 or more hours. · Your vaginal bleeding seems to be getting heavier or is still bright red 4 days after delivery. · You are dizzy or lightheaded, or you feel like you may faint. · You are vomiting or cannot keep fluids down. · You have a fever. · You have new or more belly pain. · You pass tissue (not just blood). · Your vaginal discharge smells bad. · Your belly feels tender or full and hard. · Your breasts are continuously painful or red. · You feel sad, anxious, or hopeless for more than a few days. · You have sudden, severe pain in your belly. · You have symptoms of a blood clot in your leg (called a deep vein thrombosis),          such as:  · Pain in your calf, back of the knee, thigh, or groin. · Redness and swelling in your leg or groin. · You have symptoms of preeclampsia, such as:  · Sudden swelling of your face, hands, or feet.   · New vision problems (such as dimness or blurring). · A severe headache. · Your blood pressure is higher than it should be or rises suddenly. · You have new nausea or vomiting. Watch closely for changes in your health, and be sure to contact your doctor if you have any problems. Additional Information:  Preventing Infection at Home    We care about preventing infection and avoiding the spread of germs - not only when you are in the hospital but also when you return home. When you return home from the hospital, it's important to take the following steps to help prevent infection and avoid spreading germs that could infect you and others. Ask everyone in your home to follow these guidelines, too. Clean Your Hands  · Clean your hands whenever your hands are visibly dirty, before you eat, before or after touching your mouth, nose or eyes, and before preparing food. Clean them after contact with body fluids, using the restroom, touching animals or changing diapers. · When washing hands, wet them with warm water and work up a lather. Rub hands for at least 15 seconds, then rinse them and pat them dry with a clean towel or paper towel. · When using hand sanitizers, it should take about 15 seconds to rub your hands dry. If not, you probably didn't apply enough . Cover Your Sneeze or Cough  Germs are released into the air whenever you sneeze or cough. To prevent the spread of infection:  · Turn away from other people before coughing or sneezing. · Cover your mouth or nose with a tissue when you cough or sneeze. Put the tissue in the trash. · If you don't have a tissue, cough or sneeze into your upper sleeve, not your hands. · Always clean your hands after coughing or sneezing. Care for Wounds  Your skin is your body's first line of defense against germs, but an open wound leaves an easy way for germs to enter your body.  To prevent infection:  · Clean your hands before and after changing wound dressings, and wear gloves to change dressings if recommended by your doctor. · Take special care with IV lines or other devices inserted into the body. If you must touch them, clean your hands first.  · Follow any specific instructions from your doctor to care for your wounds. Contact your doctor if you experience any signs of infection, such as fever or increased redness at the surgical or wound site. Keep a Clean Home  · Clean or wipe commonly touched hard surfaces like door handles, sinks, tabletops, phones and TV remotes. · Use products labeled \"disinfectant\" to kill harmful bacteria and viruses. · Use a clean cloth or paper towel to clean and dry surfaces. Wiping surfaces with a dirty dishcloth, sponge or towel will only spread germs. · Never share toothbrushes, gar, drinking glasses, utensils, razor blades, face cloths or bath towels to avoid spreading germs. · Be sure that the linens that you sleep on are clean. · Keep pets away from wounds and wash your hands after touching pets, their toys or bedding. We care about you and your health. Remember, preventing infections is a   team effort between you, your family, friends and health care providers. Postpartum Support    PARENTS:  Are you feeling sad or depressed? Is it difficult for you to enjoy yourself? Do you feel more irritable or tense? Do you feel anxious or panicky? Are you having difficulty bonding with your baby? Do you feel as if you are \"out of control\" or \"going crazy\"? Are you worried that you might hurt your baby or yourself? FAMILIES: Do you worry that something is wrong but don't know how to help? Do you think that your partner or spouse is having problems coping? Are you worried that it may never get better? While many women experience some mild mood change or \"the blues\" during or after the birth of a child, 1 in 9 women experience more significant symptoms of depression or anxiety. 1 in 10 Dads become depressed during the first year.     Things you can do  Being a good parent includes taking care of yourself. If you take care of yourself, you will be able to take better care of your baby and your family. · Talk to a counselor or healthcare provider who has training in  mood and anxiety problems. · Learn as much as you can about pregnancy and postpartum depression and anxiety. · Get support from family and friends. Ask for help when you need it. · Join a support group in your area or online. · Keep active by walking, stretching or whatever form of exercise helps you to feel better. · Get enough rest and time for yourself. · Eat a healthy diet. · Don't give up! It may take more than one try to get the right help you need. These are general instructions for a healthy lifestyle:    No smoking/ No tobacco products/ Avoid exposure to second hand smoke    Surgeon General's Warning:  Quitting smoking now greatly reduces serious risk to your health. Obesity, smoking, and sedentary lifestyle greatly increases your risk for illness    A healthy diet, regular physical exercise & weight monitoring are important for maintaining a healthy lifestyle    Recognize signs and symptoms of STROKE:    F-face looks uneven    A-arms unable to move or move unevenly    S-speech slurred or non-existent    T-time-call 911 as soon as signs and symptoms begin - DO NOT go       back to bed or wait to see if you get better - TIME IS BRAIN. I have had the opportunity to make my options or choices for discharge. I have received and understand these instructions.

## 2021-12-17 LAB
ANTIBODY SCREEN, EXTERNAL: NEGATIVE
CHLAMYDIA, EXTERNAL: NEGATIVE
HBSAG, EXTERNAL: NEGATIVE
HIV, EXTERNAL: NEGATIVE
N. GONORRHEA, EXTERNAL: NEGATIVE
RUBELLA, EXTERNAL: NORMAL
TYPE, ABO & RH, EXTERNAL: NORMAL

## 2022-02-23 NOTE — ANESTHESIA PROCEDURE NOTES
Epidural Block    Start time: 3/26/2017 12:47 AM  End time: 3/26/2017 12:57 AM  Performed by: Heber Del Cid by: Lyubov Nichole     Pre-Procedure  Indication: labor epidural    Preanesthetic Checklist: patient identified, risks and benefits discussed, anesthesia consent, timeout performed and anesthesia consent    Timeout Time: 00:47        Epidural:   Patient position:  Seated  Prep region:  Lumbar  Prep: Betadine    Location:  L3-4    Needle and Epidural Catheter:   Needle Type:  Tuohy  Needle Gauge:  17 G  Injection Technique:  Loss of resistance using air  Attempts:  1  Catheter Size:  18 G  Catheter at Skin Depth (cm):  8  Depth in Epidural Space (cm):  4  Events: no paresthesia and negative aspiration test    Test Dose:  Lidocaine 1.5% w/ epi and negative    Assessment:   Catheter Secured:  Tegaderm and tape  Insertion:  Uncomplicated  Patient tolerance:  Patient tolerated the procedure well with no immediate complications
pain/impaired postural control/decreased ROM/decreased strength

## 2022-03-18 PROBLEM — Z37.9 NORMAL LABOR: Status: ACTIVE | Noted: 2017-03-25

## 2022-04-04 ENCOUNTER — HOSPITAL ENCOUNTER (EMERGENCY)
Age: 37
Discharge: HOME | End: 2022-04-05
Attending: OBSTETRICS & GYNECOLOGY | Admitting: OBSTETRICS & GYNECOLOGY
Payer: MEDICAID

## 2022-04-04 LAB
ALBUMIN SERPL-MCNC: 3 G/DL (ref 3.5–5)
ALBUMIN/GLOB SERPL: 0.8 {RATIO} (ref 1.1–2.2)
ALP SERPL-CCNC: 77 U/L (ref 45–117)
ALT SERPL-CCNC: 16 U/L (ref 12–78)
AMYLASE SERPL-CCNC: 109 U/L (ref 25–115)
ANION GAP SERPL CALC-SCNC: 7 MMOL/L (ref 5–15)
APPEARANCE UR: CLEAR
AST SERPL-CCNC: 15 U/L (ref 15–37)
BACTERIA URNS QL MICRO: ABNORMAL /HPF
BASOPHILS # BLD: 0 K/UL (ref 0–0.1)
BASOPHILS NFR BLD: 0 % (ref 0–1)
BILIRUB SERPL-MCNC: 0.2 MG/DL (ref 0.2–1)
BILIRUB UR QL: NEGATIVE
BUN SERPL-MCNC: 11 MG/DL (ref 6–20)
BUN/CREAT SERPL: 16 (ref 12–20)
CALCIUM SERPL-MCNC: 8.7 MG/DL (ref 8.5–10.1)
CHLORIDE SERPL-SCNC: 108 MMOL/L (ref 97–108)
CO2 SERPL-SCNC: 24 MMOL/L (ref 21–32)
COLOR UR: ABNORMAL
CREAT SERPL-MCNC: 0.67 MG/DL (ref 0.55–1.02)
DIFFERENTIAL METHOD BLD: ABNORMAL
EOSINOPHIL # BLD: 0.4 K/UL (ref 0–0.4)
EOSINOPHIL NFR BLD: 4 % (ref 0–7)
EPITH CASTS URNS QL MICRO: ABNORMAL /LPF
ERYTHROCYTE [DISTWIDTH] IN BLOOD BY AUTOMATED COUNT: 13.5 % (ref 11.5–14.5)
GLOBULIN SER CALC-MCNC: 3.8 G/DL (ref 2–4)
GLUCOSE SERPL-MCNC: 76 MG/DL (ref 65–100)
GLUCOSE UR STRIP.AUTO-MCNC: NEGATIVE MG/DL
HCT VFR BLD AUTO: 36.5 % (ref 35–47)
HGB BLD-MCNC: 12.2 G/DL (ref 11.5–16)
HGB UR QL STRIP: NEGATIVE
HYALINE CASTS URNS QL MICRO: ABNORMAL /LPF (ref 0–2)
IMM GRANULOCYTES # BLD AUTO: 0.1 K/UL (ref 0–0.04)
IMM GRANULOCYTES NFR BLD AUTO: 1 % (ref 0–0.5)
KETONES UR QL STRIP.AUTO: ABNORMAL MG/DL
LEUKOCYTE ESTERASE UR QL STRIP.AUTO: ABNORMAL
LIPASE SERPL-CCNC: 91 U/L (ref 73–393)
LYMPHOCYTES # BLD: 2.3 K/UL (ref 0.8–3.5)
LYMPHOCYTES NFR BLD: 25 % (ref 12–49)
MCH RBC QN AUTO: 26.6 PG (ref 26–34)
MCHC RBC AUTO-ENTMCNC: 33.4 G/DL (ref 30–36.5)
MCV RBC AUTO: 79.5 FL (ref 80–99)
MONOCYTES # BLD: 0.8 K/UL (ref 0–1)
MONOCYTES NFR BLD: 9 % (ref 5–13)
NEUTS SEG # BLD: 5.8 K/UL (ref 1.8–8)
NEUTS SEG NFR BLD: 61 % (ref 32–75)
NITRITE UR QL STRIP.AUTO: NEGATIVE
NRBC # BLD: 0 K/UL (ref 0–0.01)
NRBC BLD-RTO: 0 PER 100 WBC
PH UR STRIP: 6.5 [PH] (ref 5–8)
PLATELET # BLD AUTO: 256 K/UL (ref 150–400)
PMV BLD AUTO: 11.1 FL (ref 8.9–12.9)
POTASSIUM SERPL-SCNC: 4.1 MMOL/L (ref 3.5–5.1)
PROT SERPL-MCNC: 6.8 G/DL (ref 6.4–8.2)
PROT UR STRIP-MCNC: NEGATIVE MG/DL
RBC # BLD AUTO: 4.59 M/UL (ref 3.8–5.2)
RBC #/AREA URNS HPF: ABNORMAL /HPF (ref 0–5)
SODIUM SERPL-SCNC: 139 MMOL/L (ref 136–145)
SP GR UR REFRACTOMETRY: >1.03 (ref 1–1.03)
UA: UC IF INDICATED,UAUC: ABNORMAL
UROBILINOGEN UR QL STRIP.AUTO: 1 EU/DL (ref 0.2–1)
WBC # BLD AUTO: 9.4 K/UL (ref 3.6–11)
WBC URNS QL MICRO: ABNORMAL /HPF (ref 0–4)

## 2022-04-04 PROCEDURE — 36415 COLL VENOUS BLD VENIPUNCTURE: CPT

## 2022-04-04 PROCEDURE — 74011000250 HC RX REV CODE- 250: Performed by: OBSTETRICS & GYNECOLOGY

## 2022-04-04 PROCEDURE — 85025 COMPLETE CBC W/AUTO DIFF WBC: CPT

## 2022-04-04 PROCEDURE — 83690 ASSAY OF LIPASE: CPT

## 2022-04-04 PROCEDURE — 82150 ASSAY OF AMYLASE: CPT

## 2022-04-04 PROCEDURE — 81001 URINALYSIS AUTO W/SCOPE: CPT

## 2022-04-04 PROCEDURE — 80053 COMPREHEN METABOLIC PANEL: CPT

## 2022-04-04 RX ORDER — DEXTROSE MONOHYDRATE AND SODIUM CHLORIDE 5; .45 G/100ML; G/100ML
250 INJECTION, SOLUTION INTRAVENOUS CONTINUOUS
Status: DISCONTINUED | OUTPATIENT
Start: 2022-04-05 | End: 2022-04-05 | Stop reason: HOSPADM

## 2022-04-04 RX ORDER — DEXTROSE MONOHYDRATE AND SODIUM CHLORIDE 5; .45 G/100ML; G/100ML
999 INJECTION, SOLUTION INTRAVENOUS ONCE
Status: COMPLETED | OUTPATIENT
Start: 2022-04-04 | End: 2022-04-04

## 2022-04-04 RX ORDER — DEXTROSE MONOHYDRATE AND SODIUM CHLORIDE 5; .45 G/100ML; G/100ML
999 INJECTION, SOLUTION INTRAVENOUS ONCE
Status: COMPLETED | OUTPATIENT
Start: 2022-04-05 | End: 2022-04-04

## 2022-04-04 RX ORDER — ONDANSETRON 2 MG/ML
4 INJECTION INTRAMUSCULAR; INTRAVENOUS
Status: DISCONTINUED | OUTPATIENT
Start: 2022-04-04 | End: 2022-04-05 | Stop reason: HOSPADM

## 2022-04-04 RX ORDER — ASPIRIN 81 MG/1
81 TABLET ORAL DAILY
COMMUNITY
End: 2022-07-10

## 2022-04-04 RX ADMIN — DEXTROSE AND SODIUM CHLORIDE 999 ML/HR: 5; 450 INJECTION, SOLUTION INTRAVENOUS at 23:24

## 2022-04-04 RX ADMIN — DEXTROSE AND SODIUM CHLORIDE 999 ML/HR: 5; 450 INJECTION, SOLUTION INTRAVENOUS at 22:10

## 2022-04-05 VITALS
HEIGHT: 69 IN | WEIGHT: 202 LBS | TEMPERATURE: 98.1 F | OXYGEN SATURATION: 99 % | DIASTOLIC BLOOD PRESSURE: 58 MMHG | BODY MASS INDEX: 29.92 KG/M2 | HEART RATE: 83 BPM | RESPIRATION RATE: 17 BRPM | SYSTOLIC BLOOD PRESSURE: 107 MMHG

## 2022-04-05 PROCEDURE — 96361 HYDRATE IV INFUSION ADD-ON: CPT

## 2022-04-05 PROCEDURE — 74011250636 HC RX REV CODE- 250/636: Performed by: OBSTETRICS & GYNECOLOGY

## 2022-04-05 PROCEDURE — 96360 HYDRATION IV INFUSION INIT: CPT

## 2022-04-05 PROCEDURE — 74011000250 HC RX REV CODE- 250: Performed by: OBSTETRICS & GYNECOLOGY

## 2022-04-05 PROCEDURE — 96372 THER/PROPH/DIAG INJ SC/IM: CPT

## 2022-04-05 PROCEDURE — 99284 EMERGENCY DEPT VISIT MOD MDM: CPT

## 2022-04-05 RX ADMIN — LIDOCAINE HYDROCHLORIDE 1 G: 10 INJECTION, SOLUTION EPIDURAL; INFILTRATION; INTRACAUDAL; PERINEURAL at 01:15

## 2022-04-05 NOTE — DISCHARGE INSTRUCTIONS
Weeks 26 to 30 of Your Pregnancy: Care Instructions  Overview     You are now entering your last trimester of pregnancy. Your baby is growing quickly. Patricia Gao probably feel your baby moving around more often. Your doctor may ask you to count your baby's kicks. Your back may ache as your body gets used to your baby's size and length. If you haven't already had the Tdap shot during this pregnancy, talk to your doctor about getting it. It will help protect your  against pertussis infection. During this time, it's important to take care of yourself and pay attention to what your body needs. If you feel sexual, you can explore ways to be close with your partner that match your comfort and desire. Follow-up care is a key part of your treatment and safety. Be sure to make and go to all appointments, and call your doctor if you are having problems. It's also a good idea to know your test results and keep a list of the medicines you take. How can you care for yourself at home? Take it easy at work  · Take frequent breaks. If possible, stop working when you are tired, and rest during your lunch hour. · Take bathroom breaks every 2 hours. · Change positions often. If you sit for long periods, stand up and walk around. · When you stand for a long time, keep one foot on a low stool with your knee bent. After standing a lot, sit with your feet up. · Avoid fumes, chemicals, and tobacco smoke. Be sexual in your own way  · Having sex during pregnancy is okay, unless your doctor tells you not to. · You may be very interested in sex, or you may have no interest at all. · Your growing belly can make it hard to find a good position during intercourse. [place] and explore. · You may get cramps in your uterus when your partner touches your breasts. · A back rub may relieve the backache or cramps that sometimes follow orgasm. Learn about  labor  · Watch for signs of  labor.  You may be going into labor if:  ? You have menstrual-like cramps, with or without nausea. ? You have about 6 or more contractions in 1 hour, even after you have had a glass of water and are resting. ? You have a low, dull backache that does not go away when you change your position. ? You have pain or pressure in your pelvis that comes and goes in a pattern. ? You have intestinal cramping or flu-like symptoms, with or without diarrhea.  ? You notice an increase or change in your vaginal discharge. Discharge may be heavy, mucus-like, watery, or streaked with blood. ? Your water breaks. · If you think you have  labor:  ? Drink 2 or 3 glasses of water or juice. Not drinking enough fluids can cause contractions. ? Stop what you are doing, and empty your bladder. Then lie down on your left side for at least 1 hour. ? While lying on your side, find your breast bone. Put your fingers in the soft spot just below it. Move your fingers down toward your belly button to find the top of your uterus. Check to see if it is tight. ? Contractions can be weak or strong. Record your contractions for an hour. Time a contraction from the start of one contraction to the start of the next one.  ? Single or several strong contractions without a pattern are called Hipolito-Alanis contractions. They are practice contractions but not the start of labor. They often stop if you change what you are doing. ? Call your doctor if you have regular contractions. Where can you learn more? Go to http://www.franz.com/  Enter Q157 in the search box to learn more about \"Weeks 26 to 30 of Your Pregnancy: Care Instructions. \"  Current as of: 2021               Content Version: 13.2  © 5598-1585 [employer]. Care instructions adapted under license by [employer] (which disclaims liability or warranty for this information).  If you have questions about a medical condition or this instruction, always ask your healthcare professional. [address] any warranty or liability for your use of this information. Counting Your Baby's Kicks: Care Instructions  Overview     Counting your baby's kicks is one way your doctor can tell that your baby is healthy. Most women--especially in a first pregnancy--feel their baby move for the first time between 16 and 22 weeks. The movement may feel like flutters rather than kicks. Your baby may move more at certain times of the day. When you are active, you may notice less kicking than when you are resting. At your prenatal visits, your doctor will ask whether the baby is active. In your last trimester, your doctor may ask you to count the number of times you feel your baby move. Follow-up care is a key part of your treatment and safety. Be sure to make and go to all appointments, and call your doctor if you are having problems. It's also a good idea to know your test results and keep a list of the medicines you take. How do you count fetal kicks? · A common method of checking your baby's movement is to note the length of time it takes to count ten movements (such as kicks, flutters, or rolls). · Pick your baby's most active time of day to count. This may be any time from morning to evening. · If you don't feel 10 movements in an hour, have something to eat or drink and count for another hour. If you don't feel at least 10 movements in the 2-hour period, call your doctor. When should you call for help? Call your doctor now or seek immediate medical care if:    · You noticed that your baby has stopped moving or is moving much less than normal.   Watch closely for changes in your health, and be sure to contact your doctor if you have any problems. Where can you learn more? Go to http://www.gray.com/  Enter [id number] in the search box to learn more about \"Counting Your Baby's Kicks: Care Instructions. \"  Current as of: June 16, 2021               Content Version: 13.2  © 3689-4629 [employer]. Care instructions adapted under license by [employer] (which disclaims liability or warranty for this information). If you have questions about a medical condition or this instruction, always ask your healthcare professional. [address] any warranty or liability for your use of this information. Monique Blander Contractions: Care Instructions  Your Care Instructions     Hipolito Alanis contractions prepare your uterus for labor. Think of them as a \"warm-up\" exercise that your body does. You may begin to feel them between the 28th and 30th weeks of your pregnancy. But they start as early as the 20th week. [place] Alanis contractions usually occur more often during the ninth month. They may go away when you are active and return when you rest. These contractions are like mild contractions of true labor, but they occur less often. (You feel fewer than 8 in an hour.) They don't cause your cervix to open. It may be hard for you to tell the difference between Monique Blander contractions and true labor, especially in your first pregnancy. Follow-up care is a key part of your treatment and safety. Be sure to make and go to all appointments, and call your doctor if you are having problems. It's also a good idea to know your test results and keep a list of the medicines you take. How can you care for yourself at home? · Try a warm bath to help relieve muscle tension and reduce pain. · Change positions every 30 minutes. Take breaks if you must sit for a long time. Get up and walk around. · Drink plenty of water. · Taking short walks may help you feel better. Your doctor needs to check any contractions that are getting stronger or closer together. Where can you learn more?   Go to http://www.gray.com/  Enter Z402 in the search box to learn more about \"Hipolito Leach Contractions: Care Instructions. \"  Current as of: 2021               Content Version: 13.2  © 4664-7095 [employer]. Care instructions adapted under license by [employer] (which disclaims liability or warranty for this information). If you have questions about a medical condition or this instruction, always ask your healthcare professional. [facility] any warranty or liability for your use of this information. Pregnancy Precautions: Care Instructions  Your Care Instructions     There is no sure way to prevent labor before your due date ( labor) or to prevent most other pregnancy problems. But there are things you can do to increase your chances of a healthy pregnancy. Go to your appointments, follow your doctor's advice, and take good care of yourself. Eat well, and exercise (if your doctor agrees). And make sure to drink plenty of water. Follow-up care is a key part of your treatment and safety. Be sure to make and go to all appointments, and call your doctor if you are having problems. It's also a good idea to know your test results and keep a list of the medicines you take. How can you care for yourself at home? · Make sure you go to your prenatal appointments. At each visit, your doctor will check your blood pressure. Your doctor will also check to see if you have protein in your urine. High blood pressure and protein in urine are signs of preeclampsia. This condition can be dangerous for you and your baby. · Drink plenty of fluids. Dehydration can cause contractions. If you have kidney, heart, or liver disease and have to limit fluids, talk with your doctor before you increase the amount of fluids you drink. · Tell your doctor right away if you notice any symptoms of an infection, such as:  ? Burning when you urinate. ? A foul-smelling discharge from your vagina. ? Vaginal itching. ? Unexplained fever. ?  Unusual pain or soreness in your uterus or lower belly. · Eat a balanced diet. Include plenty of foods that are high in calcium and iron. ? Foods high in calcium include milk, cheese, yogurt, almonds, and broccoli. ? Foods high in iron include red meat, shellfish, poultry, eggs, beans, raisins, whole-grain bread, and leafy green vegetables. · Do not smoke. If you need help quitting, talk to your doctor about stop-smoking programs and medicines. These can increase your chances of quitting for good. · Do not drink alcohol or use marijuana or illegal drugs. · Follow your doctor's directions about activity. Your doctor will let you know how much, if any, exercise you can do. · Ask your doctor if you can have sex. If you are at risk for early labor, your doctor may ask you to not have sex. · Take care to prevent falls. During pregnancy, your joints are loose, and your balance is off. Sports such as bicycling, skiing, or in-line skating can increase your risk of falling. And don't ride horses or motorcycles, dive, water ski, scuba dive, or parachute jump while you are pregnant. · Avoid things that can make your body too hot and may be harmful to your baby, such as a hot tub or sauna. Or talk with your doctor before doing anything that raises your body temperature. Your doctor can tell you if it's safe. · Do not take any over-the-counter or herbal medicines or supplements without talking to your doctor or pharmacist first.  When should you call for help? Call 911  anytime you think you may need emergency care. For example, call if:    · You passed out (lost consciousness).     · You have a seizure.     · You have severe vaginal bleeding.     · You have severe pain in your belly or pelvis.     · You have had fluid gushing or leaking from your vagina and you know or think the umbilical cord is bulging into your vagina. If this happens, immediately get down on your knees so your rear end (buttocks) is higher than your head.  This will decrease the pressure on the cord until help arrives. Call your doctor now or seek immediate medical care if:    · You have signs of preeclampsia, such as:  ? Sudden swelling of your face, hands, or feet. ? New vision problems (such as dimness, blurring, or seeing spots). ? A severe headache.     · You have any vaginal bleeding.     · You have belly pain or cramping.     · You have a fever.     · You have had regular contractions (with or without pain) for an hour. This means that you have 8 or more within 1 hour or 4 or more in 20 minutes after you change your position and drink fluids.     · You have a sudden release of fluid from your vagina.     · You have low back pain or pelvic pressure that does not go away.     · You notice that your baby has stopped moving or is moving much less than normal.   Watch closely for changes in your health, and be sure to contact your doctor if you have any problems. Where can you learn more? Go to http://www.franz.com/  Enter Y951 in the search box to learn more about \"Pregnancy Precautions: Care Instructions. \"  Current as of: June 16, 2021               Content Version: 13.2  © 7959-6145 [employer]. Care instructions adapted under license by [employer] (which disclaims liability or warranty for this information). If you have questions about a medical condition or this instruction, always ask your healthcare professional. [address] any warranty or liability for your use of this information.

## 2022-04-05 NOTE — H&P
Obstetrics H&P    Grace Stephens  [id number]  1985    Chief Complaint:  Pregnancy and dehydration    HPI: 31 y.o. female  26w0d with Estimated Date of Delivery: 22  Pregnancy has been complicated by grand multiparity. Patient complains of mild abdominal pain, mild nausea/vomiting and extreme fatigue  for 1 days. Patient denies headache , vaginal bleeding  and vaginal leaking of fluid . The patient was seen in the office today with similar complaints. UA showed 80 Ketones. She was prescribed Zofran, but did not fill the Rx. She felt unwell at home and was sent in for IV hydration and labs evaluation. ROS:  A comprehensive review of systems was negative except for that written in the HPI. OB History        11    Para   6    Term   6            AB   4    Living   6       SAB   3    IAB        Ectopic        Molar        Multiple   0    Live Births   6              Past Medical History:   Diagnosis Date    Abnormal Pap smear     follow up normal    Abnormal Papanicolaou smear of cervix     resolved    Asthma     uses inhaler PRN    Genital herpes     Herpes simplex virus (HSV) infection      History reviewed. No pertinent surgical history. Social History     Socioeconomic History    Marital status: SINGLE     Spouse name: Not on file    Number of children: Not on file    Years of education: Not on file    Highest education level: Not on file   Occupational History    Not on file   Tobacco Use    Smoking status: Never Smoker    Smokeless tobacco: Never Used   Vaping Use    Vaping Use: Never used   Substance and Sexual Activity    Alcohol use: No    Drug use: No    Sexual activity: Yes     Partners: Male     Birth control/protection: None       History reviewed. No pertinent family history. Allergies   Allergen Reactions    Shellfish Derived Anaphylaxis     Prior to Admission Medications   Prescriptions Last Dose Informant Patient Reported? Taking?    PNV Comb #2-Iron-Omega 3-FA [id number] mg cmpk 4/3/2022 at Unknown time  Yes Yes   Sig: Take  by mouth. aspirin delayed-release 81 mg tablet 4/3/2022 at Unknown time  Yes Yes   Sig: Take 81 mg by mouth daily. calcium carbonate (TUMS) 200 mg calcium (500 mg) chew Not Taking at Unknown time  Yes No   Sig: Take 2 Tabs by mouth three (3) times daily. Indications: heartburn   Patient not taking: Reported on 4/4/2022      Facility-Administered Medications: None     Vitals:    Patient Vitals for the past 8 hrs:   Height Weight   04/04/22 2152 5' 9\" (1.753 m) 91.6 kg (202 lb)     No data recorded. I&O:  No intake/output data recorded. No intake/output data recorded. Exam:  Patient without distress.                Abdomen soft, non-tender               Fundus soft and non tender               Perineum No sign of blood or amniotic fluid               Lower extremities edema No                Cervical Exam:  Checked in office at 1500 Closed/3cm long/High/Post/Med  Membranes:   Intact    Fetal Heart Rate: Appropriate for gestational age  Baseline: 145 per minute  Variability: moderate  Uterine Activity: None         Labs:   Recent Results (from the past 24 hour(s))   URINALYSIS W/ REFLEX CULTURE    Collection Time: 04/04/22 10:01 PM    Specimen: Urine   Result Value Ref Range    Color YELLOW/STRAW      Appearance CLEAR CLEAR      Specific gravity >1.030 (H) 1.003 - 1.030    pH (UA) 6.5 5.0 - 8.0      Protein Negative NEG mg/dL    Glucose Negative NEG mg/dL    Ketone TRACE (A) NEG mg/dL    Bilirubin Negative NEG      Blood Negative NEG      Urobilinogen 1.0 0.2 - 1.0 EU/dL    Nitrites Negative NEG      Leukocyte Esterase TRACE (A) NEG      UA:UC IF INDICATED CULTURE NOT INDICATED BY UA RESULT CNI      WBC 5-10 0 - 4 /hpf    RBC 0-5 0 - 5 /hpf    Epithelial cells MANY (A) FEW /lpf    Bacteria 1+ (A) NEG /hpf    Hyaline cast 0-2 0 - 2 /lpf   CBC WITH AUTOMATED DIFF    Collection Time: 04/04/22 10:01 PM   Result Value Ref Range    WBC 9.4 3.6 - 11.0 K/uL    RBC 4.59 3.80 - 5.20 M/uL    HGB 12.2 11.5 - 16.0 g/dL    HCT 36.5 35.0 - 47.0 %    MCV 79.5 (L) 80.0 - 99.0 FL    MCH 26.6 26.0 - 34.0 PG    MCHC 33.4 30.0 - 36.5 g/dL    RDW 13.5 11.5 - 14.5 %    PLATELET 417 686 - 529 K/uL    MPV 11.1 8.9 - 12.9 FL    NRBC 0.0 0  WBC    ABSOLUTE NRBC 0.00 0.00 - 0.01 K/uL    NEUTROPHILS 61 32 - 75 %    LYMPHOCYTES 25 12 - 49 %    MONOCYTES 9 5 - 13 %    EOSINOPHILS 4 0 - 7 %    BASOPHILS 0 0 - 1 %    IMMATURE GRANULOCYTES 1 (H) 0.0 - 0.5 %    ABS. NEUTROPHILS 5.8 1.8 - 8.0 K/UL    ABS. LYMPHOCYTES 2.3 0.8 - 3.5 K/UL    ABS. MONOCYTES 0.8 0.0 - 1.0 K/UL    ABS. EOSINOPHILS 0.4 0.0 - 0.4 K/UL    ABS. BASOPHILS 0.0 0.0 - 0.1 K/UL    ABS. IMM. GRANS. 0.1 (H) 0.00 - 0.04 K/UL    DF AUTOMATED     METABOLIC PANEL, COMPREHENSIVE    Collection Time: 04/04/22 10:01 PM   Result Value Ref Range    Sodium 139 136 - 145 mmol/L    Potassium 4.1 3.5 - 5.1 mmol/L    Chloride 108 97 - 108 mmol/L    CO2 24 21 - 32 mmol/L    Anion gap 7 5 - 15 mmol/L    Glucose 76 65 - 100 mg/dL    BUN 11 6 - 20 MG/DL    Creatinine 0.67 0.55 - 1.02 MG/DL    BUN/Creatinine ratio 16 12 - 20      GFR est AA >60 >60 ml/min/1.73m2    GFR est non-AA >60 >60 ml/min/1.73m2    Calcium 8.7 8.5 - 10.1 MG/DL    Bilirubin, total 0.2 0.2 - 1.0 MG/DL    ALT (SGPT) 16 12 - 78 U/L    AST (SGOT) 15 15 - 37 U/L    Alk.  phosphatase 77 45 - 117 U/L    Protein, total 6.8 6.4 - 8.2 g/dL    Albumin 3.0 (L) 3.5 - 5.0 g/dL    Globulin 3.8 2.0 - 4.0 g/dL    A-G Ratio 0.8 (L) 1.1 - 2.2     AMYLASE    Collection Time: 04/04/22 10:01 PM   Result Value Ref Range    Amylase 109 25 - 115 U/L   LIPASE    Collection Time: 04/04/22 10:01 PM   Result Value Ref Range    Lipase 91 73 - 393 U/L       Lab Results   Component Value Date/Time    ABO/Rh(D) O POSITIVE 08/04/2019 02:48 PM    Rubella, External Immune 01/04/2019 12:00 AM    GrBStrep, External Negative 07/11/2019 12:00 AM    HBsAg, External Negative 01/04/2019 12:00 AM    HIV, External Negative 01/04/2019 12:00 AM    RPR, External Non-Reactive 01/04/2019 12:00 AM    Gonorrhea, External Negative 01/10/2019 12:00 AM    Chlamydia, External Negative 01/10/2019 12:00 AM    ABO,Rh O positive 09/20/2016 12:00 AM       Assessment and Plan:      1. Dehydration: Patient will receive 2L IVF bolus. Labs normal. Kidney and liver function normal. CBC normal.  2. Poss UTI: Will treat once with Rocephin 1g IM x1.  3. Discharge patient home tonight. She is not anemic.                 Abhijeet Warner MD  4/4/2022

## 2022-04-05 NOTE — PROGRESS NOTES
Pt arrived to unit from home with c/o nausea, fatigue, and dehydration. Pt endorses positive fetal movement. Pt oriented to room, given call, and instructed to change into gown. 2140 - Pt connected to EFM at this time. 2205 - RN inserting PIV into right forearm at this time. 3625 - RN starting IVF bolus at this time. 2220 - RN providing pt with davey crackers, water, and ice chips and encouraging pt to try to intake some. Pt verbalizes understanding. 2255 - Pt asking for more davey crackers. Pt able to tolerate PO well.    2324 - RN hanging second IVF bag at this time. 56 - RN administering IM rocephin at this time. 12 - RN removing PIV at this time (cath tip intact). 7725 - RN at pt bedside reviewing d/c instructions with pt including nai martin contractions, pregnancy precautions, kick counts, and week 26-30 of pregnancy. 12 - RN observing pt ambulating off unit with SO and personal belongings.

## 2022-06-06 LAB — GRBS, EXTERNAL: POSITIVE

## 2022-07-07 ENCOUNTER — HOSPITAL ENCOUNTER (INPATIENT)
Age: 37
LOS: 3 days | Discharge: HOME | DRG: 560 | End: 2022-07-10
Attending: OBSTETRICS & GYNECOLOGY | Admitting: OBSTETRICS & GYNECOLOGY
Payer: MEDICAID

## 2022-07-07 PROBLEM — Z37.9 NORMAL LABOR: Status: RESOLVED | Noted: 2017-03-25 | Resolved: 2022-07-07

## 2022-07-07 PROBLEM — O09.523 AMA (ADVANCED MATERNAL AGE) MULTIGRAVIDA 35+, THIRD TRIMESTER: Status: ACTIVE | Noted: 2022-07-07

## 2022-07-07 PROBLEM — O13.3 GESTATIONAL HYPERTENSION W/O SIGNIFICANT PROTEINURIA IN 3RD TRIMESTER: Status: ACTIVE | Noted: 2022-07-07

## 2022-07-07 LAB
ABO + RH BLD: NORMAL
ALBUMIN SERPL-MCNC: 3 G/DL (ref 3.5–5)
ALBUMIN/GLOB SERPL: 0.7 {RATIO} (ref 1.1–2.2)
ALP SERPL-CCNC: 232 U/L (ref 45–117)
ALT SERPL-CCNC: 17 U/L (ref 12–78)
ANION GAP SERPL CALC-SCNC: 11 MMOL/L (ref 5–15)
AST SERPL-CCNC: 16 U/L (ref 15–37)
BILIRUB SERPL-MCNC: 0.3 MG/DL (ref 0.2–1)
BLOOD GROUP ANTIBODIES SERPL: NORMAL
BUN SERPL-MCNC: 12 MG/DL (ref 6–20)
BUN/CREAT SERPL: 14 (ref 12–20)
CALCIUM SERPL-MCNC: 9.1 MG/DL (ref 8.5–10.1)
CHLORIDE SERPL-SCNC: 107 MMOL/L (ref 97–108)
CO2 SERPL-SCNC: 21 MMOL/L (ref 21–32)
CREAT SERPL-MCNC: 0.84 MG/DL (ref 0.55–1.02)
CREAT UR-MCNC: 51 MG/DL
ERYTHROCYTE [DISTWIDTH] IN BLOOD BY AUTOMATED COUNT: 15 % (ref 11.5–14.5)
GLOBULIN SER CALC-MCNC: 4.3 G/DL (ref 2–4)
GLUCOSE SERPL-MCNC: 101 MG/DL (ref 65–100)
HCT VFR BLD AUTO: 33.6 % (ref 35–47)
HGB BLD-MCNC: 10.9 G/DL (ref 11.5–16)
MCH RBC QN AUTO: 24.9 PG (ref 26–34)
MCHC RBC AUTO-ENTMCNC: 32.4 G/DL (ref 30–36.5)
MCV RBC AUTO: 76.9 FL (ref 80–99)
NRBC # BLD: 0 K/UL (ref 0–0.01)
NRBC BLD-RTO: 0 PER 100 WBC
PLATELET # BLD AUTO: 253 K/UL (ref 150–400)
PMV BLD AUTO: 11.2 FL (ref 8.9–12.9)
POTASSIUM SERPL-SCNC: 4.6 MMOL/L (ref 3.5–5.1)
PROT SERPL-MCNC: 7.3 G/DL (ref 6.4–8.2)
PROT UR-MCNC: 7 MG/DL (ref 0–11.9)
PROT/CREAT UR-RTO: 0.1
RBC # BLD AUTO: 4.37 M/UL (ref 3.8–5.2)
SODIUM SERPL-SCNC: 139 MMOL/L (ref 136–145)
SPECIMEN EXP DATE BLD: NORMAL
WBC # BLD AUTO: 8.6 K/UL (ref 3.6–11)

## 2022-07-07 PROCEDURE — 76060000078 HC EPIDURAL ANESTHESIA: Performed by: STUDENT IN AN ORGANIZED HEALTH CARE EDUCATION/TRAINING PROGRAM

## 2022-07-07 PROCEDURE — 36415 COLL VENOUS BLD VENIPUNCTURE: CPT

## 2022-07-07 PROCEDURE — 86850 RBC ANTIBODY SCREEN: CPT

## 2022-07-07 PROCEDURE — 84156 ASSAY OF PROTEIN URINE: CPT

## 2022-07-07 PROCEDURE — 4A1HXCZ MONITORING OF PRODUCTS OF CONCEPTION, CARDIAC RATE, EXTERNAL APPROACH: ICD-10-PCS | Performed by: OBSTETRICS & GYNECOLOGY

## 2022-07-07 PROCEDURE — 80053 COMPREHEN METABOLIC PANEL: CPT

## 2022-07-07 PROCEDURE — 75410000002 HC LABOR FEE PER 1 HR: Performed by: OBSTETRICS & GYNECOLOGY

## 2022-07-07 PROCEDURE — 74011000258 HC RX REV CODE- 258: Performed by: OBSTETRICS & GYNECOLOGY

## 2022-07-07 PROCEDURE — 85027 COMPLETE CBC AUTOMATED: CPT

## 2022-07-07 PROCEDURE — 3E033VJ INTRODUCTION OF OTHER HORMONE INTO PERIPHERAL VEIN, PERCUTANEOUS APPROACH: ICD-10-PCS | Performed by: OBSTETRICS & GYNECOLOGY

## 2022-07-07 PROCEDURE — 65270000029 HC RM PRIVATE

## 2022-07-07 PROCEDURE — 74011250636 HC RX REV CODE- 250/636: Performed by: OBSTETRICS & GYNECOLOGY

## 2022-07-07 RX ORDER — OMEPRAZOLE 40 MG/1
40 CAPSULE, DELAYED RELEASE ORAL DAILY
COMMUNITY
End: 2022-07-10

## 2022-07-07 RX ORDER — OXYTOCIN/RINGER'S LACTATE 30/500 ML
10 PLASTIC BAG, INJECTION (ML) INTRAVENOUS AS NEEDED
Status: DISCONTINUED | OUTPATIENT
Start: 2022-07-07 | End: 2022-07-08

## 2022-07-07 RX ORDER — OXYTOCIN/RINGER'S LACTATE 30/500 ML
0-20 PLASTIC BAG, INJECTION (ML) INTRAVENOUS
Status: DISCONTINUED | OUTPATIENT
Start: 2022-07-07 | End: 2022-07-08

## 2022-07-07 RX ORDER — SODIUM CHLORIDE, SODIUM LACTATE, POTASSIUM CHLORIDE, CALCIUM CHLORIDE 600; 310; 30; 20 MG/100ML; MG/100ML; MG/100ML; MG/100ML
125 INJECTION, SOLUTION INTRAVENOUS CONTINUOUS
Status: DISCONTINUED | OUTPATIENT
Start: 2022-07-07 | End: 2022-07-08

## 2022-07-07 RX ORDER — SODIUM CHLORIDE 0.9 % (FLUSH) 0.9 %
5-40 SYRINGE (ML) INJECTION AS NEEDED
Status: DISCONTINUED | OUTPATIENT
Start: 2022-07-07 | End: 2022-07-08

## 2022-07-07 RX ORDER — SODIUM CHLORIDE 0.9 % (FLUSH) 0.9 %
5-40 SYRINGE (ML) INJECTION EVERY 8 HOURS
Status: DISCONTINUED | OUTPATIENT
Start: 2022-07-07 | End: 2022-07-08

## 2022-07-07 RX ORDER — NALOXONE HYDROCHLORIDE 0.4 MG/ML
0.4 INJECTION, SOLUTION INTRAMUSCULAR; INTRAVENOUS; SUBCUTANEOUS AS NEEDED
Status: DISCONTINUED | OUTPATIENT
Start: 2022-07-07 | End: 2022-07-08

## 2022-07-07 RX ORDER — CETIRIZINE HYDROCHLORIDE 10 MG/1
CAPSULE, LIQUID FILLED ORAL
COMMUNITY
End: 2022-07-10

## 2022-07-07 RX ORDER — OXYTOCIN/RINGER'S LACTATE 30/500 ML
87.3 PLASTIC BAG, INJECTION (ML) INTRAVENOUS AS NEEDED
Status: DISCONTINUED | OUTPATIENT
Start: 2022-07-07 | End: 2022-07-08

## 2022-07-07 RX ADMIN — OXYTOCIN 8 MILLI-UNITS/MIN: 10 INJECTION INTRAVENOUS at 20:29

## 2022-07-07 RX ADMIN — SODIUM CHLORIDE 5 MILLION UNITS: 900 INJECTION INTRAVENOUS at 18:19

## 2022-07-07 RX ADMIN — OXYTOCIN 10 MILLI-UNITS/MIN: 10 INJECTION INTRAVENOUS at 21:00

## 2022-07-07 RX ADMIN — SODIUM CHLORIDE, POTASSIUM CHLORIDE, SODIUM LACTATE AND CALCIUM CHLORIDE 125 ML/HR: 600; 310; 30; 20 INJECTION, SOLUTION INTRAVENOUS at 18:19

## 2022-07-07 RX ADMIN — SODIUM CHLORIDE 2.5 MILLION UNITS: 9 INJECTION, SOLUTION INTRAVENOUS at 22:19

## 2022-07-07 RX ADMIN — OXYTOCIN 2 MILLI-UNITS/MIN: 10 INJECTION INTRAVENOUS at 18:30

## 2022-07-07 NOTE — PROGRESS NOTES
1800: Pt arrived to L&D for indux for decreased FM and GHTN with elevated BPs in office today. 1815: Consents completed, PIV placed and labs drawn. Pt tolerated well. 1819: loading dose penicillin begi admin at this time. 1830: Pt educated on pitocin. Pitocin started at 2mu/min with pt consent. Pt currently in bed on phone. 1840: birthing ball set up at bedside. 1905: Bedside and Verbal shift change report given to FRACISCO Holbrook RN (oncoming nurse) by Ky Dial RN (offgoing nurse). Report included the following information SBAR, Kardex, Intake/Output, MAR, Recent Results, Med Rec Status and Quality Measures.

## 2022-07-07 NOTE — PROGRESS NOTES
1900: Shift report received from [address] [place] [address]: Patient actively pushing. RN remains in continuous attendance at the bedside. Assessment & evaluation of fetal heart rate ongoing via continuous EFM.    0359: RN remained at bedside throughout pushing. EFM continuously assessed. Vaginal delivery of viable infant. QBL 50 ml.    0506: Magana placed due to urine draining during fundal checks. Pt unable to void at this time. MD aware.

## 2022-07-08 ENCOUNTER — ANESTHESIA (OUTPATIENT)
Dept: LABOR AND DELIVERY | Age: 37
DRG: 560 | End: 2022-07-08
Payer: MEDICAID

## 2022-07-08 ENCOUNTER — ANESTHESIA EVENT (OUTPATIENT)
Dept: LABOR AND DELIVERY | Age: 37
DRG: 560 | End: 2022-07-08
Payer: MEDICAID

## 2022-07-08 PROCEDURE — 74011000250 HC RX REV CODE- 250: Performed by: STUDENT IN AN ORGANIZED HEALTH CARE EDUCATION/TRAINING PROGRAM

## 2022-07-08 PROCEDURE — 75410000000 HC DELIVERY VAGINAL/SINGLE: Performed by: OBSTETRICS & GYNECOLOGY

## 2022-07-08 PROCEDURE — 74011250637 HC RX REV CODE- 250/637: Performed by: OBSTETRICS & GYNECOLOGY

## 2022-07-08 PROCEDURE — 65270000029 HC RM PRIVATE

## 2022-07-08 PROCEDURE — 75410000003 HC RECOV DEL/VAG/CSECN EA 0.5 HR: Performed by: OBSTETRICS & GYNECOLOGY

## 2022-07-08 PROCEDURE — 10907ZC DRAINAGE OF AMNIOTIC FLUID, THERAPEUTIC FROM PRODUCTS OF CONCEPTION, VIA NATURAL OR ARTIFICIAL OPENING: ICD-10-PCS | Performed by: OBSTETRICS & GYNECOLOGY

## 2022-07-08 PROCEDURE — 75410000002 HC LABOR FEE PER 1 HR: Performed by: OBSTETRICS & GYNECOLOGY

## 2022-07-08 RX ORDER — ONDANSETRON 4 MG/1
4 TABLET, ORALLY DISINTEGRATING ORAL
Status: ACTIVE | OUTPATIENT
Start: 2022-07-08 | End: 2022-07-09

## 2022-07-08 RX ORDER — OXYTOCIN/RINGER'S LACTATE 30/500 ML
87.3 PLASTIC BAG, INJECTION (ML) INTRAVENOUS AS NEEDED
Status: DISCONTINUED | OUTPATIENT
Start: 2022-07-08 | End: 2022-07-08 | Stop reason: ALTCHOICE

## 2022-07-08 RX ORDER — IBUPROFEN 800 MG/1
800 TABLET ORAL EVERY 8 HOURS
Status: DISCONTINUED | OUTPATIENT
Start: 2022-07-08 | End: 2022-07-10 | Stop reason: HOSPADM

## 2022-07-08 RX ORDER — HYDROCORTISONE ACETATE PRAMOXINE HCL 2.5; 1 G/100G; G/100G
CREAM TOPICAL AS NEEDED
Status: DISCONTINUED | OUTPATIENT
Start: 2022-07-08 | End: 2022-07-08 | Stop reason: CLARIF

## 2022-07-08 RX ORDER — DIPHENHYDRAMINE HCL 25 MG
25 CAPSULE ORAL
Status: DISCONTINUED | OUTPATIENT
Start: 2022-07-08 | End: 2022-07-10 | Stop reason: HOSPADM

## 2022-07-08 RX ORDER — DOCUSATE SODIUM 100 MG/1
100 CAPSULE, LIQUID FILLED ORAL
Status: DISCONTINUED | OUTPATIENT
Start: 2022-07-08 | End: 2022-07-10 | Stop reason: HOSPADM

## 2022-07-08 RX ORDER — FOLIC ACID/MULTIVIT,IRON,MINER 0.4MG-18MG
1 TABLET ORAL DAILY
Status: DISCONTINUED | OUTPATIENT
Start: 2022-07-08 | End: 2022-07-10 | Stop reason: HOSPADM

## 2022-07-08 RX ORDER — NALOXONE HYDROCHLORIDE 0.4 MG/ML
0.4 INJECTION, SOLUTION INTRAMUSCULAR; INTRAVENOUS; SUBCUTANEOUS AS NEEDED
Status: DISCONTINUED | OUTPATIENT
Start: 2022-07-08 | End: 2022-07-10 | Stop reason: HOSPADM

## 2022-07-08 RX ORDER — SIMETHICONE 80 MG
80 TABLET,CHEWABLE ORAL
Status: DISCONTINUED | OUTPATIENT
Start: 2022-07-08 | End: 2022-07-10 | Stop reason: HOSPADM

## 2022-07-08 RX ORDER — HYDROCODONE BITARTRATE AND ACETAMINOPHEN 5; 325 MG/1; MG/1
1 TABLET ORAL
Status: DISCONTINUED | OUTPATIENT
Start: 2022-07-08 | End: 2022-07-10 | Stop reason: HOSPADM

## 2022-07-08 RX ORDER — ZOLPIDEM TARTRATE 5 MG/1
5 TABLET ORAL
Status: DISCONTINUED | OUTPATIENT
Start: 2022-07-08 | End: 2022-07-10 | Stop reason: HOSPADM

## 2022-07-08 RX ORDER — OXYTOCIN/RINGER'S LACTATE 30/500 ML
10 PLASTIC BAG, INJECTION (ML) INTRAVENOUS AS NEEDED
Status: DISCONTINUED | OUTPATIENT
Start: 2022-07-08 | End: 2022-07-08 | Stop reason: ALTCHOICE

## 2022-07-08 RX ORDER — BUPIVACAINE HYDROCHLORIDE 5 MG/ML
INJECTION, SOLUTION EPIDURAL; INTRACAUDAL
Status: COMPLETED | OUTPATIENT
Start: 2022-07-08 | End: 2022-07-08

## 2022-07-08 RX ORDER — MISOPROSTOL 200 UG/1
800 TABLET ORAL ONCE
Status: COMPLETED | OUTPATIENT
Start: 2022-07-08 | End: 2022-07-08

## 2022-07-08 RX ORDER — ACETAMINOPHEN 325 MG/1
650 TABLET ORAL
Status: DISCONTINUED | OUTPATIENT
Start: 2022-07-08 | End: 2022-07-10 | Stop reason: HOSPADM

## 2022-07-08 RX ADMIN — HYDROCODONE BITARTRATE AND ACETAMINOPHEN 1 TABLET: 5; 325 TABLET ORAL at 18:10

## 2022-07-08 RX ADMIN — HYDROCODONE BITARTRATE AND ACETAMINOPHEN 1 TABLET: 5; 325 TABLET ORAL at 14:05

## 2022-07-08 RX ADMIN — IBUPROFEN 800 MG: 800 TABLET, FILM COATED ORAL at 14:05

## 2022-07-08 RX ADMIN — HYDROCODONE BITARTRATE AND ACETAMINOPHEN 1 TABLET: 5; 325 TABLET ORAL at 08:29

## 2022-07-08 RX ADMIN — MUPIROCIN: 20 OINTMENT TOPICAL at 14:48

## 2022-07-08 RX ADMIN — HYDROCODONE BITARTRATE AND ACETAMINOPHEN 1 TABLET: 5; 325 TABLET ORAL at 22:04

## 2022-07-08 RX ADMIN — DOCUSATE SODIUM 100 MG: 100 CAPSULE, LIQUID FILLED ORAL at 18:10

## 2022-07-08 RX ADMIN — BUPIVACAINE HYDROCHLORIDE 7 MG: 5 INJECTION, SOLUTION EPIDURAL; INTRACAUDAL; PERINEURAL at 03:36

## 2022-07-08 RX ADMIN — IBUPROFEN 800 MG: 800 TABLET, FILM COATED ORAL at 22:04

## 2022-07-08 RX ADMIN — Medication 1 TABLET: at 08:32

## 2022-07-08 RX ADMIN — MISOPROSTOL 800 MCG: 200 TABLET ORAL at 03:55

## 2022-07-08 RX ADMIN — IBUPROFEN 800 MG: 800 TABLET, FILM COATED ORAL at 06:19

## 2022-07-08 NOTE — H&P
Obstetrics Admission H&P    Grace Stephens  [id number]  1985    Chief Complaint:  Pregnancy and Gestational HTN    HPI: 30 y.o. female  39w3d with Estimated Date of Delivery: 22  Pregnancy has been complicated by advanced maternal age and grandmultiparity, and gestational HTN. Patient complains of mild headache   for 1 days. Patient denies vaginal bleeding  and vaginal leaking of fluid . The patient came to the office with complaints of decrease FM today. Her Bp was noted to be elevated at 148/94. She was sent down for IOL on L&D. ROS:  A comprehensive review of systems was negative except for that written in the HPI. OB History        11    Para   6    Term   6            AB   4    Living   6       SAB   3    IAB        Ectopic        Molar        Multiple   0    Live Births   6              Past Medical History:   Diagnosis Date    Abnormal Pap smear     follow up normal    Abnormal Papanicolaou smear of cervix     resolved    Asthma     uses inhaler PRN    Genital herpes     Gestational hypertension w/o significant proteinuria in 3rd trimester 2022    Herpes simplex virus (HSV) infection      No past surgical history on file.   Social History     Socioeconomic History    Marital status: SINGLE     Spouse name: Not on file    Number of children: Not on file    Years of education: Not on file    Highest education level: Not on file   Occupational History    Not on file   Tobacco Use    Smoking status: Never Smoker    Smokeless tobacco: Never Used   Vaping Use    Vaping Use: Never used   Substance and Sexual Activity    Alcohol use: No    Drug use: No    Sexual activity: Yes     Partners: Male     Birth control/protection: None   Other Topics Concern     Service Not Asked    Blood Transfusions Not Asked    Caffeine Concern Not Asked    Occupational Exposure Not Asked    Hobby Hazards Not Asked    Sleep Concern Not Asked    Stress Concern Not Asked    Weight Concern Not Asked    Special Diet Not Asked    Back Care Not Asked    Exercise Not Asked    Bike Helmet Not Asked   [address] Not Asked    Self-Exams Not Asked   Social History Narrative    Not on file     Social Determinants of Health     Financial Resource Strain:     Difficulty of Paying Living Expenses: Not on file   Food Insecurity:     Worried About Running Out of Food in the Last Year: Not on file    Tina of Food in the Last Year: Not on file   Transportation Needs:     Lack of Transportation (Medical): Not on file    Lack of Transportation (Non-Medical): Not on file   Physical Activity:     Days of Exercise per Week: Not on file    Minutes of Exercise per Session: Not on file   Stress:     Feeling of Stress : Not on file   Social Connections:     Frequency of Communication with Friends and Family: Not on file    Frequency of Social Gatherings with Friends and Family: Not on file    Attends Restorationist Services: Not on file    Active Member of [address] [place] [address] or Organizations: Not on file    Attends Club or Organization Meetings: Not on file    Marital Status: Not on file   Intimate Partner Violence:     Fear of Current or Ex-Partner: Not on file    Emotionally Abused: Not on file    Physically Abused: Not on file    Sexually Abused: Not on file   Housing Stability:     Unable to Pay for Housing in the Last Year: Not on file    Number of [place] in the Last Year: Not on file    Unstable Housing in the Last Year: Not on file     No family history on file. Allergies   Allergen Reactions    Shellfish Derived Anaphylaxis     Prior to Admission Medications   Prescriptions Last Dose Informant Patient Reported? Taking? Cetirizine (ZyrTEC) 10 mg cap 7/7/2022 at Unknown time  Yes Yes   Sig: Take  by mouth. PNV Comb #2-Iron-Omega 3-FA [id number] mg cmpk 6/30/2022 at Unknown time  Yes Yes   Sig: Take  by mouth.    aspirin delayed-release 81 mg tablet 7/7/2022 at Unknown time  Yes Yes   Sig: Take 81 mg by mouth daily. omeprazole (PRILOSEC) 40 mg capsule 2022 at Unknown time  Yes Yes   Sig: Take 40 mg by mouth daily. Facility-Administered Medications: None         Vitals:    Patient Vitals for the past 8 hrs:   BP Temp Pulse Resp SpO2 Height Weight   22 122/84 98.4 °F (36.9 °C) 78 16 -- -- --   22 1839 -- -- 80 -- 100 % -- --   22 127/82 97.8 °F (36.6 °C) 80 16 100 % -- --   22 1808 -- -- -- -- -- 5' 9\" (1.753 m) 102.5 kg (226 lb)     Temp (24hrs), Av.1 °F (36.7 °C), Min:97.8 °F (36.6 °C), Max:98.4 °F (36.9 °C)    I&O:  No intake/output data recorded. No intake/output data recorded. Exam:  Patient without distress.                Abdomen soft, non-tender               Fundus soft and non tender               Perineum No sign of blood or amniotic fluid               Lower extremities edema 2+                 Cervical Exam:  2 cm dilated    50% effaced    -3 station    Presenting Part: cephalic  Membranes:   Intact    Fetal Heart Rate: Reactive  Baseline: 145 per minute  Variability: moderate  Accelerations: no  Decelerations: none  Uterine Activity: Frequency: Every 5 minutes  Pit 2         Labs:   Recent Results (from the past 24 hour(s))   CBC W/O DIFF    Collection Time: 22  6:13 PM   Result Value Ref Range    WBC 8.6 3.6 - 11.0 K/uL    RBC 4.37 3.80 - 5.20 M/uL    HGB 10.9 (L) 11.5 - 16.0 g/dL    HCT 33.6 (L) 35.0 - 47.0 %    MCV 76.9 (L) 80.0 - 99.0 FL    MCH 24.9 (L) 26.0 - 34.0 PG    MCHC 32.4 30.0 - 36.5 g/dL    RDW 15.0 (H) 11.5 - 14.5 %    PLATELET 633 598 - 636 K/uL    MPV 11.2 8.9 - 12.9 FL    NRBC 0.0 0  WBC    ABSOLUTE NRBC 0.00 0.00 - 7.73 K/uL   METABOLIC PANEL, COMPREHENSIVE    Collection Time: 22  6:13 PM   Result Value Ref Range    Sodium 139 136 - 145 mmol/L    Potassium 4.6 3.5 - 5.1 mmol/L    Chloride 107 97 - 108 mmol/L    CO2 21 21 - 32 mmol/L    Anion gap 11 5 - 15 mmol/L    Glucose 101 (H) 65 - 100 mg/dL    BUN 12 6 - 20 MG/DL    Creatinine 0.84 0.55 - 1.02 MG/DL    BUN/Creatinine ratio 14 12 - 20      GFR est AA >60 >60 ml/min/1.73m2    GFR est non-AA >60 >60 ml/min/1.73m2    Calcium 9.1 8.5 - 10.1 MG/DL    Bilirubin, total 0.3 0.2 - 1.0 MG/DL    ALT (SGPT) 17 12 - 78 U/L    AST (SGOT) 16 15 - 37 U/L    Alk. phosphatase 232 (H) 45 - 117 U/L    Protein, total 7.3 6.4 - 8.2 g/dL    Albumin 3.0 (L) 3.5 - 5.0 g/dL    Globulin 4.3 (H) 2.0 - 4.0 g/dL    A-G Ratio 0.7 (L) 1.1 - 2.2     TYPE & SCREEN    Collection Time: 07/07/22  6:13 PM   Result Value Ref Range    Crossmatch Expiration 07/10/2022,2359     ABO/Rh(D) Aminata Ni POSITIVE     Antibody screen NEG    PROTEIN/CREATININE RATIO, URINE    Collection Time: 07/07/22  6:13 PM   Result Value Ref Range    Protein, urine random 7 0.0 - 11.9 mg/dL    Creatinine, urine 51.00 mg/dL    Protein/Creat. urine Ratio 0.1         Lab Results   Component Value Date/Time    ABO/Rh(D) O POSITIVE 07/07/2022 06:13 PM    Rubella, External Immune 12/17/2021 12:00 AM    GrBStrep, External Positive 06/06/2022 12:00 AM    HBsAg, External Negative 12/17/2021 12:00 AM    HIV, External Negative 12/17/2021 12:00 AM    RPR, External Non-Reactive 01/04/2019 12:00 AM    Gonorrhea, External negative 12/17/2021 12:00 AM    Chlamydia, External Negative 12/17/2021 12:00 AM    ABO,Rh O positive 12/17/2021 12:00 AM       Assessment and Plan:      1. GBS positive: Continue PCN G  2. Pitocin now and AROM later  3. CAT 1 FHT    She is not anemic.                 Reyna Potter MD  7/7/2022

## 2022-07-08 NOTE — ANESTHESIA PREPROCEDURE EVALUATION
Anesthetic History   No history of anesthetic complications            Review of Systems / Medical History  Patient summary reviewed, nursing notes reviewed and pertinent labs reviewed    Pulmonary            Asthma : well controlled  Pertinent negatives: No recent URI     Neuro/Psych   Within defined limits           Cardiovascular    Hypertension: poorly controlled              Exercise tolerance: >4 METS     GI/Hepatic/Renal  Within defined limits              Endo/Other  Within defined limits           Other Findings              Physical Exam    Airway  Mallampati: II  TM Distance: 4 - 6 cm  Neck ROM: normal range of motion   Mouth opening: Normal     Cardiovascular    Rhythm: regular  Rate: normal         Dental    Dentition: Upper dentition intact and Lower dentition intact     Pulmonary  Breath sounds clear to auscultation               Abdominal         Other Findings            Anesthetic Plan    ASA: 3  Anesthesia type: spinal          Induction: Intravenous  Anesthetic plan and risks discussed with: Patient      Charting completed after epidural placement.

## 2022-07-08 NOTE — ANESTHESIA PROCEDURE NOTES
Spinal Block    Start time: 7/8/2022 3:20 AM  End time: 7/8/2022 3:36 AM  Performed by: Mariela Bolivar DO  Authorized by: Mariela Bolivar DO     Pre-procedure:   Indications: procedure for pain  Preanesthetic Checklist: patient identified, risks and benefits discussed, anesthesia consent, site marked, patient being monitored, timeout performed and fire risk safety assessment completed and verbalized      Spinal Block:   Patient Position:  Seated  Prep Region:  Lumbar  Prep: chlorhexidine      Location:  L2-3  Technique:  Single shot  Local: bupivacaine (PF) (MARCAINE) 0.5 % (5 mg/mL) intrathecal, 7 mg        Needle:   Needle Type:  Pencan  Needle Gauge:  27 G  Attempts:  1      Events: CSF confirmed, no blood with aspiration and no paresthesia        Assessment:  Insertion:  Uncomplicated  Patient tolerance:  Patient tolerated the procedure well with no immediate complications

## 2022-07-08 NOTE — LACTATION NOTE
This note was copied from a baby's chart. Mother states this is her third child to breastfeed. She has latched infant a few times this morning and doesn't have any concerns regarding her latch. It is time baby to eat now. [facility] handed baby over and placed infant skin to skin with her. Baby was still a little sleepy and mom was able to hand express a few drops and finger feed. LC encouraged mom to continue massage and hand expression when infant will not wake for a feeding. LC to come to next feeding. Breastfeeding booklet provided and lactation phone line given to patient. She will call for further assistance. Reviewed breastfeeding basics:  How milk is made and normal  breastfeeding behaviors discussed. Supply and demand,  stomach size, early feeding cues, skin to skin bonding with comfortable positioning and baby led latch-on reviewed. How to identify signs of successful breastfeeding sessions reviewed; education on assymetrical latch, signs of effective latching vs shallow, in-effective latching, normal  feeding frequency and duration and expected infant output discussed. Normal course of breastfeeding discussed including the AAP's recommendation that children receive exclusive breast milk feedings for the first six months of life with breast milk feedings to continue through the first year of life and/or beyond as complimentary table foods are added. Breastfeeding Booklet and Warm line information provided with discussion. Discussed typical  weight loss and the importance of pediatrician appointment within 24-48 hours of discharge, at 2 weeks of life and normalcy of requesting pediatric weight checks as needed in between visits. Biological Nurturing breastfeeding principles taught. How Biological Nurturing (BN)promotes optimal breastfeeding (BF) sessions discussed. Mother encouraged to seek comfortable semi-reclining breastfeeding positions.   Infant placed frontally along maternal contour. Primitive innate feeding reflexes/behaviors of the  discussed. BN tips and techniques shared; assisted with comfortable breastfeeding positioning. Hand Expression Education:  Mom taught how to manually hand express her colostrum. Emphasized the importance of providing infant with valuable colostrum as infant rests skin to skin at breast.  Aware to avoid extended periods of non-feeding. Aware to offer 10-20+ drops of colostrum every 2-3 hours until infant is latching and nursing effectively. Taught the rationale behind this low tech but highly effective evidence based practice. Pt will successfully establish breastfeeding by feeding in response to early feeding cues or wake every 3h, will obtain deep latch, and will keep log of feedings/output. Taught to BF at hunger cues and or q 2-3 hrs and to offer 10-20 drops of hand expressed colostrum at any non-feeds. Breast Assessment  Left Breast: Medium  Left Nipple: Intact,Everted  Right Breast: Medium  Right Nipple: Everted,Intact  Breast- Feeding Assessment  Attends Breast-Feeding Classes: No  Breast-Feeding Experience: Yes  Breast Trauma/Surgery: No  Type/Quality: Good  Lactation Consultant Visits  Breast-Feedings: Good   Mother/Infant Observation  Mother Observation: Breast comfortable,Holds breast,Recognizes feeding cues  Infant Observation: Latches nipple and aereolae,Lips flanged, upper,Lips flanged, lower,Frenulum checked,Feeding cues  LATCH Documentation  Latch: Too sleepy or reluctant, no latch achieved  Audible Swallowing: None  Type of Nipple: Everted (after stimulation)  Comfort (Breast/Nipple): Soft/non-tender  Hold (Positioning): No assist from staff, mother able to position/hold infant  LATCH Score: 6    Discussed with mother her plan for feeding. Reviewed the benefits of exclusive breast milk feeding during the hospital stay.   Informed mother of the risks of using formula to supplement in the first few days of life as well as the benefits of successful breast milk feeding; referred mother to the handout in her admission packet related to these topics. Mother acknowledges understanding of information reviewed and states that it is her plan tobreast milk feed exclusively her infant. Will support her choice and offer additional information as needed.

## 2022-07-08 NOTE — L&D DELIVERY NOTE
Delivery Summary    Patient: Delmi Bains MRN: [id number]  SSN: [id number]    YOB: 1985  Age: 30 y.o. Sex: female       Information for the patient's :  Ronny Vogt, Female April [[id number]]       Labor Events:    Labor: No    Steroids: None   Cervical Ripening Date/Time:       Cervical Ripening Type: None   Antibiotics During Labor: Yes   Rupture Identifier:      Rupture Date/Time: 2022 8:58 PM   Rupture Type: AROM   Amniotic Fluid Volume:      Amniotic Fluid Description: Clear    Amniotic Fluid Odor: None    Induction: Oxytocin;AROM       Induction Date/Time: 2022 6:30 PM    Indications for Induction: Gestational Hypertension; Other(comment)    Augmentation:     Augmentation Date/Time:      Indications for Augmentation:     Labor complications:          Additional complications:        Delivery Events:  Indications For Episiotomy:     Episiotomy: None   Perineal Laceration(s): None   Repaired:     Periurethral Laceration Location:      Repaired:     Labial Laceration Location:     Repaired:     Sulcal Laceration Location:     Repaired:     Vaginal Laceration Location:     Repaired:     Cervical Laceration Location:     Repaired:     Repair Suture: None   Number of Repair Packets:     Estimated Blood Loss (ml):  100ml   Quantitative Blood Loss (ml)                Delivery Date: 2022    Delivery Time: 3:49 AM  Delivery Type: Vaginal, Spontaneous  Sex:  Female    Gestational Age: 43w3d   Delivery Clinician:  Osmin Montelongo  Living Status: Living   Delivery Location: L&D 207          APGARS  One minute Five minutes Ten minutes   Skin color: 1   1        Heart rate: 2   2        Grimace: 2   2        Muscle tone: 2   2        Breathin   2        Totals: 9   9            Presentation: Vertex    Position: Middle Occiput Posterior  Resuscitation Method:  Tactile Stimulation;Suctioning-bulb     Meconium Stained: None      Cord Information: 3 Vessels  Complications: None  Cord around:    Delayed cord clamping? Yes  Cord clamped date/time:2022  3:52 AM  Disposition of Cord Blood: Lab    Blood Gases Sent?: No    Placenta:  Date/Time: 2022  3:52 AM  Removal: Expressed      Appearance: Normal      Measurements:  Birth Weight:        Birth Length:        Head Circumference:        Chest Circumference:       Abdominal Girth:       Other Providers:   ABIMBOLA Rg;SASHA HUNT, Obstetrician;Primary Nurse;Primary  Nurse;Charge Nurse       Group B Strep:   Lab Results   Component Value Date/Time    GrBStrep, External Positive 2022 12:00 AM     Information for the patient's :  Carlos Mckeon, Female April [[id number]]   No results found for: Chang Lechuga, PCTALEEG, BILI, ABORHEXT, [address]

## 2022-07-08 NOTE — PROGRESS NOTES
Labor Progress Note  Patient seen, fetal heart rate and contraction pattern evaluated, patient examined. She is attempting to labor naturally. Pitocin has been held at this time as the patient is currently getting a bolus for an epidural.     Patient Vitals for the past 2 hrs:   Temp   07/08/22 0100 98.2 °F (36.8 °C)       Physical Exam:  Cervical Exam:  6 cm dilated  80% effaced  -2 station    Presenting Part: cephalic  Uterine Activity: Frequency: Every 3-4 minutes  Fetal Heart Rate: Baseline: 140 per minute  Variability: moderate  Accelerations: no  Decelerations: none    Assessment/Plan:  Reassuring fetal status, Continue plan for vaginal delivery   Epidural pending.       Kalen Kaba MD

## 2022-07-08 NOTE — PROGRESS NOTES
7/8/2022  3:13 PM    CM met with CHARBEL to complete initial assessment and begin discharge planning. MOB verified and confirmed demographics. CHARBEL lives with MARLEN- Catrina Kingston ( ), along with 5 of their 7 children, at the address on file. CHARBEL is currently not employed, and plans to be home with infant. FOENRIQUE is employed and will be taking adequate time off. CHARBEL reports she has good family support, and feels like she has the support she needs when she returns home. CHARBEL plans to breast feed baby and has pump to use at home. Dr. Bradley Noe will provide follow up care for infant. CHARBEL has car seat, bassinet/crib, clothing, bottles and all necessary supplies for baby. CHARBEL has Medicaid, and will be adding baby to this policy. CM discussed process to add baby to insurance, MOB verbalized understanding. Care Management Interventions  PCP Verified by CM: Yes ([facility])  Mode of Transport at Discharge:  Other (see comment)  Transition of Care Consult (CM Consult): Discharge Planning  Support Systems: Spouse/Significant Other,Other Family Member(s)  Confirm Follow Up Transport: Family  Discharge Location  Patient Expects to be Discharged to[de-identified] Home with family assistance  [place]

## 2022-07-08 NOTE — PROGRESS NOTES
Labor Progress Note  Patient seen, fetal heart rate and contraction pattern evaluated, patient examined. Sheis feeling the CTXs now. Patient Vitals for the past 2 hrs:   BP Temp Pulse Resp   07/07/22 1959 122/84 98.4 °F (36.9 °C) 78 16       Physical Exam:  Cervical Exam:  2 cm dilated  50% effaced  -3 station  .   AROM: large amounts of clear discharge  Presenting Part: cephalic  Uterine Activity: Frequency: Every 2 minutes  Pit 8  Fetal Heart Rate: Baseline: 145 per minute  Variability: minimal  Accelerations: no  Decelerations: none    Assessment/Plan:  Reassuring fetal status, Continue plan for vaginal delivery   Epidural prdenny Garcia MD

## 2022-07-08 NOTE — DISCHARGE SUMMARY
Patient ID:  Harpreet Miranda  [id number]  30 y.o.  1985    Admit Date: 2022    Discharge Date: 7/10/2022     Admitting Physician: Gracie Centeno MD  Attending Physician: Geremias Gibbs MD    Admission Diagnoses:   Pregnant at 39.3 weeks  Gestational Hypertension, 3rd trimester  AMA; Multiparity; 3rd trimester    Procedures for this admission: Spinal; . Hospital Course: Admitted for IOL for gestational HTN and decreased fetal movement. L&D and MIU course were normal.     Discharge Diagnoses: Same as above with  producing a viable infant. Information for the patient's :  Abdon Sánchez, Female April [[id number]]         Discharge Disposition:  Home    Discharge Condition:  Good    Additional Diagnoses: advanced maternal age and grandmultiparity, and gestational HTN. Maternal Labs:   Lab Results   Component Value Date/Time    HBsAg, External Negative 2021 12:00 AM    HIV, External Negative 2021 12:00 AM    Rubella, External Immune 2021 12:00 AM    RPR, External Non-Reactive 2019 12:00 AM    GrBStrep, External Positive 2022 12:00 AM       Cord Blood Results:   Information for the patient's :  Abdon Sánchez, Female April [[id number]]   No results found for: PCTABR, ABORH, PCTDIG, BILI, ABORH, ABORHEXT           History of Present Illness:   OB History        11    Para   6    Term   6            AB   4    Living   6       SAB   3    IAB        Ectopic        Molar        Multiple   0    Live Births   6              Admitted for induction of labor. Hospital Course:   Patient was admitted as above and delivered via . Please the chart for details. The postpartum course was unremarkable. She was deemed stable for discharge home on day 2.     Follow up with Dr. Gracie Centeno MD in 6 weeks        Signed:  Gracie Centeno MD  2022  4:20 AM

## 2022-07-09 PROCEDURE — 74011250637 HC RX REV CODE- 250/637: Performed by: OBSTETRICS & GYNECOLOGY

## 2022-07-09 PROCEDURE — 2709999900 HC NON-CHARGEABLE SUPPLY

## 2022-07-09 PROCEDURE — 65270000029 HC RM PRIVATE

## 2022-07-09 RX ADMIN — Medication 1 TABLET: at 08:37

## 2022-07-09 RX ADMIN — DOCUSATE SODIUM 100 MG: 100 CAPSULE, LIQUID FILLED ORAL at 08:37

## 2022-07-09 RX ADMIN — HYDROCODONE BITARTRATE AND ACETAMINOPHEN 1 TABLET: 5; 325 TABLET ORAL at 12:33

## 2022-07-09 RX ADMIN — IBUPROFEN 800 MG: 800 TABLET, FILM COATED ORAL at 16:55

## 2022-07-09 RX ADMIN — HYDROCODONE BITARTRATE AND ACETAMINOPHEN 1 TABLET: 5; 325 TABLET ORAL at 02:53

## 2022-07-09 RX ADMIN — IBUPROFEN 800 MG: 800 TABLET, FILM COATED ORAL at 08:37

## 2022-07-09 RX ADMIN — HYDROCODONE BITARTRATE AND ACETAMINOPHEN 1 TABLET: 5; 325 TABLET ORAL at 08:37

## 2022-07-09 RX ADMIN — HYDROCODONE BITARTRATE AND ACETAMINOPHEN 1 TABLET: 5; 325 TABLET ORAL at 20:34

## 2022-07-09 NOTE — PROGRESS NOTES
Post-Partum Day Number 1 Progress Note    April Kat     Assessment: Doing well, post partum day 1    Plan:  - GHTN: Bps elevated but not severe. No meds. Will continue to monitor  - Continue routine postpartum and perineal care as well as maternal education.  - N/A   - Plan discharge home [id number] Discharge Date: Tomorrow. Information for the patient's :  Carlos Mckeon, Female April [[id number]]   Vaginal, Spontaneous    Patient doing well without significant complaint. Voiding without difficulty, normal lochia. Denies HA, VC, CP, SOB, RUQpain. Vitals:  Patient Vitals for the past 24 hrs:   Temp Pulse Resp BP SpO2   22 0923 -- -- -- 124/74 --   22 0830 98.5 °F (36.9 °C) 71 18 (!) 152/94 99 %   22 0333 98.6 °F (37 °C) 70 16 (!) 144/79 99 %   22 2305 98.2 °F (36.8 °C) 81 18 (!) 141/81 97 %   22 1831 97.6 °F (36.4 °C) 79 16 134/86 100 %   22 1400 98.6 °F (37 °C) 74 16 135/80 99 %   22 1000 98.7 °F (37.1 °C) 78 16 127/75 100 %       Exam:   Patient without distress. Fundus firm, nontender per nursing fundal checks. Perineum with normal lochia noted per nursing assessment. Lower extremities are negative for pathological edema.     Labs:     Lab Results   Component Value Date/Time    WBC 8.6 2022 06:13 PM    WBC 9.4 2022 10:01 PM    WBC 11.9 (H) 2019 02:40 PM    WBC 10.9 2017 09:52 PM    WBC 7.9 2014 10:40 AM    WBC 8.9 2009 05:15 AM    HGB 10.9 (L) 2022 06:13 PM    HGB 12.2 2022 10:01 PM    HGB 11.7 2019 02:40 PM    HGB 10.2 (L) 2017 09:52 PM    HGB 11.5 2014 10:40 AM    HGB 10.5 (L) 2009 05:15 AM    HCT 33.6 (L) 2022 06:13 PM    HCT 36.5 2022 10:01 PM    HCT 36.1 2019 02:40 PM    HCT 31.9 (L) 2017 09:52 PM    HCT 34.6 (L) 2014 10:40 AM    HCT 32.0 (L) 2009 05:15 AM    PLATELET 777  06:13 PM    PLATELET 569 04/04/2022 10:01 PM    PLATELET 408 34/54/1461 02:40 PM    PLATELET 152 33/40/9639 09:52 PM    PLATELET 456 45/00/9088 10:40 AM    PLATELET 852 45/29/7238 05:15 AM       No results found for this or any previous visit (from the past 24 hour(s)).

## 2022-07-09 NOTE — LACTATION NOTE
This note was copied from a baby's chart. MOB called out for latch assistance, infant placed in the biological breastfeeding position. Infant latched immediately and maintained a rhythmic suck pattern with intermittent swallows heard by LC. Mom is a little sore so lanolin and hydrogel pads were provided. Mother instructed to continue laid back breastfeeding to achieve a deeper latch and avoid more friction damage. Breastfeeding infant on demand encouraged along wit avoiding pacifiers. Engorgement relief discussed. Reviewed breastfeeding basics:  How milk is made and normal  breastfeeding behaviors discussed. Supply and demand,  stomach size, early feeding cues, skin to skin bonding with comfortable positioning and baby led latch-on reviewed. How to identify signs of successful breastfeeding sessions reviewed; education on assymetrical latch, signs of effective latching vs shallow, in-effective latching, normal  feeding frequency and duration and expected infant output discussed. Normal course of breastfeeding discussed including the AAP's recommendation that children receive exclusive breast milk feedings for the first six months of life with breast milk feedings to continue through the first year of life and/or beyond as complimentary table foods are added. Breastfeeding Booklet and Warm line information provided with discussion. Discussed typical  weight loss and the importance of pediatrician appointment within 24-48 hours of discharge, at 2 weeks of life and normalcy of requesting pediatric weight checks as needed in between visits. Engorgement Care Guidelines:  Reviewed how milk is made and normal phases of milk production. Taught care of engorged breasts - frequent breastfeeding encouraged, cool packs and motrin as tolerated. Anticipatory guidance shared.     Pt will successfully establish breastfeeding by feeding in response to early feeding cues or wake every 3h, will obtain deep latch, and will keep log of feedings/output. Taught to BF at hunger cues and or q 2-3 hrs and to offer 10-20 drops of hand expressed colostrum at any non-feeds. Breast Assessment  Left Breast: Medium  Left Nipple: Everted,Intact,Friction damage  Right Breast: Medium  Right Nipple: Intact,Everted,Friction damage  Breast- Feeding Assessment  Attends Breast-Feeding Classes: No  Breast-Feeding Experience: Yes  Breast Trauma/Surgery: No  Type/Quality: Good  Lactation Consultant Visits  Breast-Feedings: Good   Mother/Infant Observation  Mother Observation: Breast comfortable,Close hold,Holds breast,Lets baby end feeding,Nipple round on release,Recognizes feeding cues  Infant Observation: Breast tissue moves,Lips flanged, lower,Lips flanged, upper,Opens mouth  LATCH Documentation  Latch: Repeated attempts, hold nipple in mouth, stimulate to suck  Audible Swallowing: Spontaneous and intermittent (24 hours old)  Type of Nipple: Everted (after stimulation)  Comfort (Breast/Nipple): Soft/non-tender  Hold (Positioning): No assist from staff, mother able to position/hold infant  LATCH Score: 9    Discussed with mother her plan for feeding. Reviewed the benefits of exclusive breast milk feeding during the hospital stay. Informed mother of the risks of using formula to supplement in the first few days of life as well as the benefits of successful breast milk feeding; referred mother to the handout in her admission packet related to these topics. Mother acknowledges understanding of information reviewed and states that it is her plan tobreast milk feed exclusively her infant. Will support her choice and offer additional information as needed.

## 2022-07-10 VITALS
DIASTOLIC BLOOD PRESSURE: 89 MMHG | BODY MASS INDEX: 33.47 KG/M2 | RESPIRATION RATE: 16 BRPM | SYSTOLIC BLOOD PRESSURE: 137 MMHG | WEIGHT: 226 LBS | OXYGEN SATURATION: 98 % | HEART RATE: 85 BPM | HEIGHT: 69 IN | TEMPERATURE: 99.1 F

## 2022-07-10 PROCEDURE — 74011250637 HC RX REV CODE- 250/637: Performed by: OBSTETRICS & GYNECOLOGY

## 2022-07-10 RX ORDER — IBUPROFEN 600 MG/1
600 TABLET ORAL
Qty: 40 TABLET | Refills: 1 | Status: SHIPPED | OUTPATIENT
Start: 2022-07-10

## 2022-07-10 RX ADMIN — Medication 1 TABLET: at 11:04

## 2022-07-10 RX ADMIN — DOCUSATE SODIUM 100 MG: 100 CAPSULE, LIQUID FILLED ORAL at 11:04

## 2022-07-10 RX ADMIN — IBUPROFEN 800 MG: 800 TABLET, FILM COATED ORAL at 11:04

## 2022-07-10 RX ADMIN — IBUPROFEN 800 MG: 800 TABLET, FILM COATED ORAL at 02:55

## 2022-07-10 NOTE — DISCHARGE INSTRUCTIONS
POSTPARTUM DISCHARGE INSTRUCTIONS       Name:  Grace Stephens  YOB: 1985  Admission Diagnosis:  Pregnancy [Z34.90]     Discharge Diagnosis:    Problem List as of 7/10/2022 Date Reviewed: 7/7/2022          Codes Class Noted - Resolved    Gestational hypertension w/o significant proteinuria in 3rd trimester ICD-10-CM: O13.3  ICD-9-CM: 642.33  7/7/2022 - Present        * (Principal) AMA (advanced maternal age) multigravida 33+, third trimester ICD-10-CM: O09.523  ICD-9-CM: 659.63  7/7/2022 - Present        Grand multiparity in labor and delivery ICD-10-CM: O80  ICD-9-CM: 659.40  8/4/2019 - Present        39 weeks gestation of pregnancy ICD-10-CM: Z3A.39  ICD-9-CM: V22.2  8/20/2014 - Present        RESOLVED: Normal labor ICD-10-CM: O80, Z37.9  ICD-9-CM: 868  3/25/2017 - 7/7/2022            Attending Physician:  John Dockery MD    Delivery Type:  Vaginal Childbirth: What To Expect At Home    Your Recovery: Your body will slowly heal in the next few weeks. It is easy to get too tired and overwhelmed during the first weeks after your baby is born. Changes in your hormones can shift your mood without warning. You may find it hard to meet the extra demands on your energy and time. Take it easy on yourself. Follow-up care is a key part of your treatment and safety. Be sure to make and go to all appointments, and call your doctor if you are having problems. It's also a good idea to know your test results and keep a list of the medicines you take. How can you care for yourself at home? Vaginal bleeding and cramps  · After delivery, you will have a bloody discharge from the vagina. This will turn pink within a week and then white or yellow after about 10 days. It may last for 2 to 4 weeks or longer, until the uterus has healed. Use pads instead of tampons until you stop bleeding. · Do not worry if you pass some blood clots, as long as they are smaller than a golf ball.  If you have a tear or stitches in your vaginal area, change the pad at least every 4 hours to prevent soreness and infection. · You may have cramps for the first few days after childbirth. These are normal and occur as the uterus shrinks to normal size. Take an over-the-counter pain medicine, such as acetaminophen (Tylenol), ibuprofen (Advil, Motrin), or naproxen (Aleve), for cramps. Read and follow all instructions on the label. Do not take aspirin, because it can cause more bleeding. Do not take acetaminophen (Tylenol) and other acetaminophen containing medications (i.e. Percocet) at the same time. Breast fullness  · Your breasts may overfill (engorge) in the first few days after delivery. To help milk flow and to relieve pain, warm your breasts in the shower or by using warm, moist towels before nursing. · If you are not nursing, do not put warmth on your breasts or touch your breasts. Wear a tight bra or sports bra and use ice until the fullness goes away. This usually takes 2 to 3 days. · Put ice or a cold pack on your breast after nursing to reduce swelling and pain. Put a thin cloth between the ice and your skin. Activity  · Eat a balanced diet. Do not try to lose weight by cutting calories. Keep taking your prenatal vitamins, or take a multivitamin. · Get as much rest as you can. Try to take naps when your baby sleeps during the day. · Get some exercise every day. But do not do any heavy exercise until your doctor says it is okay. · Wait until you are healed (about 4 to 6 weeks) before you have sexual intercourse. Your doctor will tell you when it is okay to have sex. · Talk to your doctor about birth control. You can get pregnant even before your period returns. Also, you can get pregnant while you are breast-feeding. Mental Health  · Many women get the \"baby blues\" during the first few days after childbirth. You may lose sleep, feel irritable, and cry easily. You may feel happy one minute and sad the next. Hormone changes are one cause of these emotional changes. Also, the demands of a new baby, along with visits from relatives or other family needs, add to a mother's stress. The \"baby blues\" often peak around the fourth day. Then they ease up in less than 2 weeks. · If your moodiness or anxiety lasts for more than 2 weeks, or if you feel like life is not worth living, you may have postpartum depression. This is different for each mother. Some mothers with serious depression may worry intensely about their infant's well-being. Others may feel distant from their child. Some mothers might even feel that they might harm their baby. A mother may have signs of paranoia, wondering if someone is watching her. · With all the changes in your life, you may not know if you are depressed. Pregnancy sometimes causes changes in how you feel that are similar to the symptoms of depression. · Symptoms of depression include:  · Feeling sad or hopeless and losing interest in daily activities. These are the most common symptoms of depression. · Sleeping too much or not enough. · Feeling tired. You may feel as if you have no energy. · Eating too much or too little. · [employer] ([employer]) offers a Warm line; Chat with the Expert phone sessions; Information and Articles about Pregnancy and Postpartum Mood Disorders; Comprehensive List of Free Support Groups; Knowledgeable local coordinators who will offer support, information, and resources; Guide to Resources on [employer]; Calendar of events in the  mood disorders community; Latest News and Research; and [facility] [address] for [employer]. Remember - You are not alone; You are not to blame; With help, you will be well. 6-062-156-PPD(8931). WWW. POSTPARTUM. NET   · Writing or talking about death, such as writing suicide notes or talking about guns, knives, or pills.  Keep the numbers for these national suicide hotlines: 1-573-966-TALK (6-430.485.2513) and 3-359-TSEEHYZ (7-500.977.2082). If you or someone you know talks about suicide or feeling hopeless, get help right away. Constipation and Hemorrhoids  · Drink plenty of fluids, enough so that your urine is light yellow or clear like water. If you have kidney, heart, or liver disease and have to limit fluids, talk with your doctor before you increase the amount of fluids you drink. · Eat plenty of fiber each day. Have a bran muffin or bran cereal for breakfast, and try eating a piece of fruit for a mid-afternoon snack. · For painful, itchy hemorrhoids, put ice or a cold pack on the area several times a day for 10 minutes at a time. Follow this by putting a warm compress on the area for another 10 to 20 minutes or by sitting in a shallow, warm bath. When should you call for help? Call 911 anytime you think you may need emergency care. For example, call if:  · You are thinking of hurting yourself, your baby, or anyone else. · You passed out (lost consciousness). · You have symptoms of a blood clot in your lung (called a pulmonary embolism). These may include:    · Sudden chest pain. · Trouble breathing. · Coughing up blood. Call your doctor now or seek immediate medical care if:  · You have severe vaginal bleeding. · You are soaking through a pad each hour for 2 or more hours. · Your vaginal bleeding seems to be getting heavier or is still bright red 4 days after delivery. · You are dizzy or lightheaded, or you feel like you may faint. · You are vomiting or cannot keep fluids down. · You have a fever. · You have new or more belly pain. · You pass tissue (not just blood). · Your vaginal discharge smells bad. · Your belly feels tender or full and hard. · Your breasts are continuously painful or red. · You feel sad, anxious, or hopeless for more than a few days. · You have sudden, severe pain in your belly.   · You have symptoms of a blood clot in your leg (called a deep vein thrombosis), such as:  · Pain in your calf, back of the knee, thigh, or groin. · Redness and swelling in your leg or groin. · You have symptoms of preeclampsia, such as:  · Sudden swelling of your face, hands, or feet. · New vision problems (such as dimness or blurring). · A severe headache. · Your blood pressure is higher than it should be or rises suddenly. · You have new nausea or vomiting. Watch closely for changes in your health, and be sure to contact your doctor if you have any problems. Additional Information:  Learning About Hypertensive Disorders After Childbirth    When you go home  Your blood pressure will most likely return to normal a few days after delivery. Your doctor will want to check your blood pressure sometime in the first week after you leave the hospital.    Some women still have high blood pressure 6 weeks after childbirth. But most return to normal levels over the long term. · Take and record your blood pressure at home if your doctor tells you to. · Learn the importance of the two measures of blood pressure (such as 120 over 80, or 120/80). The first number is the systolic pressure. This is the force of blood on the artery walls as the heart pumps. The second number is the diastolic pressure. This is the force of blood on the artery walls between heartbeats, when the heart is at rest. You have a choice of monitors to use. Manual monitor: You pump up the cuff and use a stethoscope to listen for your  Pulse. · Electronic monitor: The cuff inflates, and a gauge shows your pulse rate. · To take your blood pressure:  · Ask your doctor to check your blood pressure monitor to be sure that it is accurate and that the cuff fits you. Also ask your doctor to watch you use it, to make sure that you are using it right.   · You should not eat, use tobacco products, or use medicine known to raise blood pressure (such as some nasal decongestant sprays) before you take your blood pressure. · Avoid taking your blood pressure if you have just exercised or are nervous or upset. Rest at least 15 minutes before you take your blood pressure. · Be safe with medicines. If you take medicine, take it exactly as prescribed. Call your doctor if you think you are having a problem with your medicine. · Do not smoke. Quitting smoking will help lower your blood pressure and improve your baby's growth and health. If you need help quitting, talk to your doctor about stop-smoking programs and medicines. These can increase your chances of quitting for good. · Eat a balanced and healthy diet that has lots of fruits and vegetables. Follow-up care is a key part of your treatment and safety. Be sure to make and go to all appointments, and call your doctor if you are having problems. It's also a good idea to know your test results and keep a list of the medicines you take. These are general instructions for a healthy lifestyle:    No smoking/ No tobacco products/ Avoid exposure to second hand smoke    Surgeon General's Warning:  Quitting smoking now greatly reduces serious risk to your health. Obesity, smoking, and sedentary lifestyle greatly increases your risk for illness    A healthy diet, regular physical exercise & weight monitoring are important for maintaining a healthy lifestyle    Recognize signs and symptoms of STROKE:    F-face looks uneven    A-arms unable to move or move unevenly    S-speech slurred or non-existent    T-time-call 911 as soon as signs and symptoms begin - DO NOT go       back to bed or wait to see if you get better - TIME IS BRAIN. I have had the opportunity to make my options or choices for discharge. I have received and understand these instructions.

## 2022-07-10 NOTE — PROGRESS NOTES
Post-Partum Day Number 2 Progress Note    Patient doing well post-partum without significant complaints. Voiding without difficulty, normal lochia. Breast feeding well. Desires discharge home. Vitals:  Patient Vitals for the past 24 hrs:   BP Temp Pulse Resp SpO2   07/10/22 0814 135/78 98.3 °F (36.8 °C) 67 16 98 %   07/10/22 0305 131/77 97.8 °F (36.6 °C) 74 16 98 %   22 2310 124/81 97.7 °F (36.5 °C) 78 16 99 %   22 1929 135/85 98.9 °F (37.2 °C) 76 16 99 %   22 1132 127/74 98.3 °F (36.8 °C) 71 16 98 %   22 0923 124/74 -- -- -- --     Temp (24hrs), Av.2 °F (36.8 °C), Min:97.7 °F (36.5 °C), Max:98.9 °F (37.2 °C)      Vital signs stable, afebrile. Exam:  Patient without distress. Abdomen soft, fundus firm, nontender               Lower extremities, ADELAIDA nontender w/ 1+ edema    Labs: No results found for this or any previous visit (from the past 24 hour(s)). Assessment and Plan:  PPD 2, stable, routine care; O+/RI/XX \"Beulah\"  1. Discharge today-instructions reviewed. 2. Gest HTN: Only one elevated BP yesterday while patient was in pain. All others 120-30s/70-80s without meds. 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 3. Hx PP Depression: Patient states she feels good at this time and declines intervention.

## 2022-07-10 NOTE — ROUTINE PROCESS
Patient off unit in stable condition via wheelchair with volunteers for discharge home per  MD.  Patient is to follow up in 6 weeks and is aware. Prescriptions called to patients pharmacy Patient denies H/A, dizziness, nausea and or vomiting or pain at this time. Infant in car seat with mom.

## 2023-01-09 NOTE — IP AVS SNAPSHOT
[address] 
[phone] Patient: Cary Mtz MRN: [id number] OWP:[birth year] You are allergic to the following Allergen Reactions Shellfish Derived Anaphylaxis Recent Documentation Height Weight Breastfeeding? BMI OB Status Smoking Status 1.753 m 89.8 kg Yes 29.24 kg/m2 Recent pregnancy Never Smoker Unresulted Labs Order Current Status SAMPLE TO BLOOD BANK In process Emergency Contacts Name Discharge Info Relation Home Work Mobile [address] CAREGIVER [3] Life Partner [7] [phone] About your hospitalization You were admitted on:  March 25, 2017 You last received care in the:  [facility] You were discharged on:  March 28, 2017 Unit phone number:  [phone] Why you were hospitalized Your primary diagnosis was:  Not on File Your diagnoses also included:  Normal Labor Providers Seen During Your Hospitalizations Provider Role Specialty Primary office phone Alice Eli MD Attending Provider Obstetrics & Gynecology [phone] Sushila Nair MD Attending Provider Obstetrics & Gynecology [phone] Your Primary Care Physician (PCP) Primary Care Physician Office Phone Office Fax NONE ** None ** ** None ** Follow-up Information Follow up With Details Comments Contact Info None   None (395) Patient stated that they have no PCP Current Discharge Medication List  
  
CONTINUE these medications which have CHANGED Dose & Instructions Dispensing Information Comments Morning Noon Evening Bedtime  
 ibuprofen 600 mg tablet Commonly known as:  MOTRIN What changed:   
- medication strength 
- how much to take - when to take this 
- reasons to take this Your last dose was:     
   
Your next dose is:    
   
   
 Dose:  600 mg  
 [facility]  Office: [address], DO, Quang Main, DO, Bret Cruz, DO, Maria Elena Richardson, DO, Brittney Martines MD, Sinan Hu MD, Denys Roberts MD, Justice Juarez MD,  Adithya Yadav MD, Anthony Robles MD, Susan Young, DO, Padmini Cullen MD,  Robson Carcamo MD, Heidy Burnett MD, Cate Navarro, DO, Janine West MD, Ashley Conrad MD, Sabino Washington DO, Virginie Shah MD, Knadice Brooks MD, Anna Olivia MD, Tsering Nazario MD, Rachael Powell DO, Ele Duran MD, Loraine Jang MD, Sharron Brown, CNP,  Tre Jacob, CNP, Leila Nguyen, CNP, Sourav Mackay, CNP,  Hernando Benton, [facility], [place], CNP, Rajani Riley, CNP, Gerardo Pavon, CNP, Fermin Mayorga, CNP, Bere Rubin, [facility], Amarjit Lawson, PA-C, Taran Pollard, CNS, [facility], CNP, Yuriy [facility], [facility]    Discharge Summary     Patient ID: Sulaiman Barron  :  1991   MRN: [id number]     ACCOUNT:  [de-identified]   Patient's PCP: Jackie Dinero  Admit Date: 12/15/2022   Discharge Date: 2023     Length of Stay: 25  Code Status:  Full Code  Admitting Physician: No admitting provider for patient encounter. Discharge Physician: Ryan Estrada MD     Active Discharge Diagnoses:     Hospital Problem Lists:  Principal Problem:    Drug overdose of undetermined intent, initial encounter  Active Problems:    COVID-19    Acute respiratory insufficiency    Alcoholic intoxication with complication ([address]    Altered mental status    Acute respiratory failure with hypoxia ([facility] [address] [facility]  Resolved Problems:    * No resolved hospital problems.  *      Admission Condition:  poor     Discharged Condition: fair    Hospital Stay:     Hospital Course:  Sulaiman Barron is a 36 y.o. female who was admitted for the management of   Drug overdose of undetermined intent, initial encounter , presented to ER with Drug Overdose    36year-old Take 1 Tab by mouth every six (6) hours as needed for Pain. Quantity:  30 Tab Refills:  0 ASK your doctor about these medications Dose & Instructions Dispensing Information Comments Morning Noon Evening Bedtime COLACE 100 mg capsule Generic drug:  docusate sodium Your last dose was: Your next dose is:    
   
   
 Dose:  100 mg Take 100 mg by mouth two (2) times a day. Refills:  0 HYDROcodone-acetaminophen 5-325 mg per tablet Commonly known as:  Lenon Gauze Your last dose was: Your next dose is:    
   
   
 Dose:  1 Tab Take 1 tablet by mouth every four (4) hours as needed. Quantity:  15 tablet Refills:  0 VALTREX 500 mg tablet Generic drug:  valACYclovir Your last dose was: Your next dose is: Take  by mouth two (2) times a day. Indications: GENITAL HERPES SIMPLEX, suppression started at 35 weeks Refills:  0  
     
   
   
   
  
 ZANTAC 150 mg tablet Generic drug:  raNITIdine Your last dose was: Your next dose is:    
   
   
 Dose:  150 mg Take 150 mg by mouth two (2) times a day. Refills:  0 Where to Get Your Medications Information on where to get these meds will be given to you by the nurse or doctor. ! Ask your nurse or doctor about these medications  
  ibuprofen 600 mg tablet Discharge Instructions POST DELIVERY DISCHARGE INSTRUCTIONS Name: Cary Mtz YOB: 1985 Primary Diagnosis: Active Problems: 
  Normal labor (3/25/2017) General:  
 
Diet/Diet Restrictions: 
Eight 8-ounce glasses of fluid daily (water, juices); avoid excessive caffeine intake. Meals/snacks as desired which are high in fiber and carbohydrates and low in fat and cholesterol. Physical Activity / Restrictions / Safety: female with past medical history of drug overdose, chronic alcohol use presented to the hospital after she was found unresponsive due to suspected drug overdose. Tox screen negative but significant elevated level of alcohol. GCS was 3 on arrival.  Patient was intubated for airway protection. ED work-up showed elevated alcohol level. Patient was also noted to have COVID-positive test.  Patient was admitted on 12/15 and was kept in ICU. She was extubated on 12/23. Extubation was delayed due to absence of cuff leak. ENT was consulted, the cuff leak absence was due to presence of large ET tube. 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 Patient failed bedside swallow study and NG tube was placed for swallow study. Patient gradually improved and passed swallow study on 1/3. Patient gets mood swings and anxiety, psych consulted, mri brain normal, home meds abilify and venlafaxine resumed. Psych cleared patient for discharge. Patient mentation is stable. She will return home with her sister. Tolerating diet.       Significant therapeutic interventions: as above    Significant Diagnostic Studies:   Labs / Micro:  CBC:   Lab Results   Component Value Date/Time    WBC 8.7 01/03/2023 05:57 AM    RBC 2.78 01/03/2023 05:57 AM    HGB 10.1 01/03/2023 05:57 AM    HCT 30.8 01/03/2023 05:57 AM    .8 01/03/2023 05:57 AM    MCH 36.3 01/03/2023 05:57 AM    MCHC 32.8 01/03/2023 05:57 AM    RDW 14.7 01/03/2023 05:57 AM     01/03/2023 05:57 AM     BMP:    Lab Results   Component Value Date/Time    GLUCOSE 115 01/09/2023 06:27 AM     01/09/2023 06:27 AM    K 3.6 01/09/2023 06:27 AM     01/09/2023 06:27 AM    CO2 23 01/09/2023 06:27 AM    ANIONGAP 11 01/09/2023 06:27 AM    BUN 6 01/09/2023 06:27 AM    CREATININE 0.47 01/09/2023 06:27 AM    BUNCRER NOT REPORTED 04/14/2014 07:12 PM    CALCIUM 9.8 01/09/2023 06:27 AM    LABGLOM >60 01/09/2023 06:27 AM    GFRAA Can Avoid heavy lifting, no more that 8 lbs. For 2-3 weeks; limit use of stairs to 2 times daily for the first week home. No driving for one week. Avoid intercourse 4-6 weeks, no douching or tampon use. Check with obstetrician before starting or resuming an exercise program.    
 
 
Discharge Instructions/Special Treatment/Home Care Needs:  
 
Continue prenatal vitamins. Continue to use squirt bottle with warm water on your episiotomy after each bathroom use until bleeding stops. If steri-strips applied to your incision, remove in 7-10 days. Call your doctor for the following:  
 
Fever over 101 degrees by mouth. Vaginal bleeding heavier than a normal menstrual period or clot larger than a golf ball. Red streaks or increased swelling of legs, painful red streaks on your breast. 
Painful urination, constipation and increased pain or swelling or discharge with your incision. If you feel extremely anxious or overwhelmed. If you have thoughts of harming yourself and/or your baby. Pain Management:  
 
Pain Management:  
Take Acetaminophen (Tylenol) or Ibuprofen (Advil, Motrin), as directed for pain. Use a warm Sitz bath 3 times daily to relieve episiotomy or hemorrhoidal discomfort. Heating pad to  incision as needed. For hemorrhoidal discomfort, use Tucks and Anusol cream as needed and directed. Follow-Up Care: These are general instructions for a healthy lifestyle: No smoking/ No tobacco products/ Avoid exposure to second hand smoke Surgeon General's Warning:  Quitting smoking now greatly reduces serious risk to your health. Obesity, smoking, and sedentary lifestyle greatly increases your risk for illness A healthy diet, regular physical exercise & weight monitoring are important for maintaining a healthy lifestyle Recognize signs and symptoms of STROKE: 
 
F-face looks uneven A-arms unable to move or move unevenly S-speech slurred or non-existent not be calculated 03/10/2022 06:14 AM    GFR      03/10/2022 06:14 AM     HFP:    Lab Results   Component Value Date/Time    PROT 7.9 01/06/2023 07:22 AM     CMP:    Lab Results   Component Value Date/Time    GLUCOSE 115 01/09/2023 06:27 AM     01/09/2023 06:27 AM    K 3.6 01/09/2023 06:27 AM     01/09/2023 06:27 AM    CO2 23 01/09/2023 06:27 AM    BUN 6 01/09/2023 06:27 AM    CREATININE 0.47 01/09/2023 06:27 AM    ANIONGAP 11 01/09/2023 06:27 AM    ALKPHOS 119 01/06/2023 07:22 AM    ALT 44 01/06/2023 07:22 AM    AST 84 01/06/2023 07:22 AM    BILITOT 0.4 01/06/2023 07:22 AM    LABALBU 3.3 01/06/2023 07:22 AM    ALBUMIN 0.7 01/06/2023 07:22 AM    LABGLOM >60 01/09/2023 06:27 AM    GFRAA Can not be calculated 03/10/2022 06:14 AM    GFR      03/10/2022 06:14 AM    PROT 7.9 01/06/2023 07:22 AM    CALCIUM 9.8 01/09/2023 06:27 AM     PT/INR:    Lab Results   Component Value Date/Time    PROTIME 12.4 12/15/2022 12:28 PM    INR 1.2 12/15/2022 12:28 PM        Radiology:  MRI BRAIN W WO CONTRAST    Result Date: 1/8/2023  No acute intracranial abnormality per enhanced brain MRI.  No acute stroke, intracranial hemorrhage or abnormal enhancement.     FL MODIFIED BARIUM SWALLOW W VIDEO    Result Date: 1/3/2023  Deep laryngeal penetration of thin liquid. Trace laryngeal penetration of soft solid. Puree, cookie and nectar thick liquid well-tolerated. Please see separate speech pathology report for full discussion of findings and recommendations.       Consultations:    Consults:     Final Specialist Recommendations/Findings:   IP CONSULT TO CRITICAL CARE  IP CONSULT TO INFECTIOUS DISEASES  IP CONSULT TO DIETITIAN  IP CONSULT TO SOCIAL WORK  IP CONSULT TO OTOLARYNGOLOGY  IP CONSULT TO SOCIAL WORK  IP CONSULT TO SOCIAL WORK  IP CONSULT TO DIETITIAN  IP CONSULT TO DIETITIAN  IP CONSULT TO PHYSICAL MEDICINE REHAB  IP CONSULT TO PSYCHIATRY      The patient was seen and examined on day of discharge and this discharge summary  T-time-call 911 as soon as signs and symptoms begin-DO NOT go Back to bed or wait to see if you get better-TIME IS BRAIN. Signed By: Markus Judd RN                                                                                                   Date: 3/28/2017 Time: 9:36 AM 
 
 
 
Discharge Orders None [employer] Announcement We are excited to announce that we are making your provider's discharge notes available to you in Shopseen. You will see these notes when they are completed and signed by the physician that discharged you from your recent hospital stay. If you have any questions or concerns about any information you see in Shopseen, please call the Health Information Department where you were seen or reach out to your Primary Care Provider for more information about your plan of care. Introducing hospitals & HEALTH SERVICES! Radha Chan introduces Shopseen patient portal. Now you can access parts of your medical record, email your doctor's office, and request medication refills online. 1. In your internet browser, go to [email or web address] 2. Click on the First Time User? Click Here link in the Sign In box. You will see the New Member Sign Up page. 3. Enter your Shopseen Access Code exactly as it appears below. You will not need to use this code after youve completed the sign-up process. If you do not sign up before the expiration date, you must request a new code. · Shopseen Access Code: [email or web address] Expires: 6/21/2017  9:38 AM 
 
4. Enter the last four digits of your Social Security Number (xxxx) and Date of Birth (mm/dd/yyyy) as indicated and click Submit. You will be taken to the next sign-up page. 5. Create a Shopseen ID. This will be your Shopseen login ID and cannot be changed, so think of one that is secure and easy to remember. 6. Create a Shopseen password. You can change your password at any time. is in conjunction with any daily progress note from day of discharge. Discharge plan:     Disposition: Home    Physician Follow Up:     [facility]  [address] [facility] [address]    Schedule an appointment as soon as possible for a visit in 3 day(s)      [facility]  [address] [facility] [address]  [phone]    Schedule an appointment as soon as possible for a visit  anxiety       Requiring Further Evaluation/Follow Up POST HOSPITALIZATION/Incidental Findings: follow up with psych    Diet: regular diet    Activity: As tolerated    Instructions to Patient: take psych meds as ordered  Daily Effexor and abilify    Discharge Medications:      Medication List        START taking these medications      ARIPiprazole 10 MG disintegrating tablet  Commonly known as: ABILIFY  Take 1 tablet by mouth daily  Start taking on: January 10, 2023  Replaces: ARIPiprazole 10 MG tablet            CONTINUE taking these medications      bisacodyl 5 MG EC tablet  Commonly known as: DULCOLAX  Take 2 tablets by mouth daily as needed for Constipation     folic acid 1 MG tablet  Commonly known as: FOLVITE  Take 1 tablet by mouth daily     thiamine 100 MG tablet  Take 1 tablet by mouth daily     venlafaxine 150 MG extended release capsule  Commonly known as: EFFEXOR XR  Take 1 capsule by mouth daily (with breakfast). STOP taking these medications      ARIPiprazole 10 MG tablet  Commonly known as: ABILIFY  Replaced by: ARIPiprazole 10 MG disintegrating tablet     ibuprofen 600 MG tablet  Commonly known as: ADVIL;MOTRIN               Where to Get Your Medications        These medications were sent to [facility] [address] [facility], [address] [facility]  [address] [facility], [address]  [address]      Phone: [phone]   ARIPiprazole 10 MG disintegrating tablet         No discharge procedures on file.     Time Spent on discharge is  35 mins in patient examination, evaluation, 7. Enter your Password Reset Question and Answer. This can be used at a later time if you forget your password. 8. Enter your e-mail address. You will receive e-mail notification when new information is available in [address] 9. Click Sign Up. You can now view and download portions of your medical record. 10. Click the Download Summary menu link to download a portable copy of your medical information. If you have questions, please visit the Frequently Asked Questions section of the [employer] website. Remember, [employer] is NOT to be used for urgent needs. For medical emergencies, dial 911. Now available from your iPhone and Android! General Information Please provide this summary of care documentation to your next provider. Patient Signature:  ____________________________________________________________ Date:  ____________________________________________________________  
  
[facility] Provider Signature:  ____________________________________________________________ Date:  ____________________________________________________________ counseling as well as medication reconciliation, prescriptions for required medications, discharge plan and follow up. Electronically signed by   Anna Marie Rushing MD  1/9/2023  1:09 PM      Thank you Dr. Kenyatta Merino for the opportunity to be involved in this patient's care.

## 2023-05-01 NOTE — ANESTHESIA PREPROCEDURE EVALUATION
Anesthetic History   No history of anesthetic complications            Review of Systems / Medical History  Patient summary reviewed, nursing notes reviewed and pertinent labs reviewed    Pulmonary            Asthma        Neuro/Psych   Within defined limits           Cardiovascular  Within defined limits                Exercise tolerance: >4 METS     GI/Hepatic/Renal  Within defined limits              Endo/Other  Within defined limits           Other Findings              Physical Exam    Airway  Mallampati: II  TM Distance: 4 - 6 cm  Neck ROM: normal range of motion   Mouth opening: Normal     Cardiovascular    Rhythm: regular  Rate: normal         Dental    Dentition: Upper dentition intact and Lower dentition intact     Pulmonary  Breath sounds clear to auscultation               Abdominal         Other Findings            Anesthetic Plan    ASA: 2  Anesthesia type: epidural          Induction: Intravenous  Anesthetic plan and risks discussed with: Patient      Charting completed after epidural placement. Benzoyl Peroxide Pregnancy And Lactation Text: This medication is Pregnancy Category C. It is unknown if benzoyl peroxide is excreted in breast milk.

## 2024-02-29 LAB
C. TRACHOMATIS, EXTERNAL RESULT: NEGATIVE
N. GONORRHOEAE, EXTERNAL RESULT: NEGATIVE

## 2024-03-23 LAB
ABO, EXTERNAL RESULT: NORMAL
HEP B, EXTERNAL RESULT: NEGATIVE
HIV, EXTERNAL RESULT: NEGATIVE
RH FACTOR, EXTERNAL RESULT: POSITIVE
RPR, EXTERNAL RESULT: NORMAL
RUBELLA TITER, EXTERNAL RESULT: NORMAL

## 2024-09-16 LAB — GBS, EXTERNAL RESULT: POSITIVE

## 2024-10-14 ENCOUNTER — ANESTHESIA (OUTPATIENT)
Facility: HOSPITAL | Age: 39
DRG: 560 | End: 2024-10-14
Payer: MEDICAID

## 2024-10-14 ENCOUNTER — ANESTHESIA EVENT (OUTPATIENT)
Facility: HOSPITAL | Age: 39
DRG: 560 | End: 2024-10-14
Payer: MEDICAID

## 2024-10-14 ENCOUNTER — HOSPITAL ENCOUNTER (INPATIENT)
Facility: HOSPITAL | Age: 39
LOS: 2 days | Discharge: HOME | DRG: 560 | End: 2024-10-16
Attending: OBSTETRICS & GYNECOLOGY | Admitting: OBSTETRICS & GYNECOLOGY
Payer: MEDICAID

## 2024-10-14 LAB
ABO + RH BLD: NORMAL
BLOOD GROUP ANTIBODIES SERPL: NORMAL
ERYTHROCYTE [DISTWIDTH] IN BLOOD BY AUTOMATED COUNT: 15.1 % (ref 11.5–14.5)
HCT VFR BLD AUTO: 29.9 % (ref 35–47)
HGB BLD-MCNC: 9.5 G/DL (ref 11.5–16)
MCH RBC QN AUTO: 24.1 PG (ref 26–34)
MCHC RBC AUTO-ENTMCNC: 31.8 G/DL (ref 30–36.5)
MCV RBC AUTO: 75.9 FL (ref 80–99)
NRBC # BLD: 0 K/UL (ref 0–0.01)
NRBC BLD-RTO: 0 PER 100 WBC
PLATELET # BLD AUTO: 212 K/UL (ref 150–400)
PMV BLD AUTO: 11.6 FL (ref 8.9–12.9)
RBC # BLD AUTO: 3.94 M/UL (ref 3.8–5.2)
SPECIMEN EXP DATE BLD: NORMAL
WBC # BLD AUTO: 7.6 K/UL (ref 3.6–11)

## 2024-10-14 PROCEDURE — 3700000025 EPIDURAL BLOCK: Performed by: ANESTHESIOLOGY

## 2024-10-14 PROCEDURE — 1120000000 HC RM PRIVATE OB

## 2024-10-14 PROCEDURE — 36415 COLL VENOUS BLD VENIPUNCTURE: CPT

## 2024-10-14 PROCEDURE — 7220000101 HC DELIVERY VAGINAL/SINGLE: Performed by: OBSTETRICS & GYNECOLOGY

## 2024-10-14 PROCEDURE — 2500000003 HC RX 250 WO HCPCS: Performed by: OBSTETRICS & GYNECOLOGY

## 2024-10-14 PROCEDURE — 2580000003 HC RX 258: Performed by: OBSTETRICS & GYNECOLOGY

## 2024-10-14 PROCEDURE — 6370000000 HC RX 637 (ALT 250 FOR IP): Performed by: OBSTETRICS & GYNECOLOGY

## 2024-10-14 PROCEDURE — 2500000003 HC RX 250 WO HCPCS: Performed by: NURSE ANESTHETIST, CERTIFIED REGISTERED

## 2024-10-14 PROCEDURE — 00HU33Z INSERTION OF INFUSION DEVICE INTO SPINAL CANAL, PERCUTANEOUS APPROACH: ICD-10-PCS | Performed by: ANESTHESIOLOGY

## 2024-10-14 PROCEDURE — 86850 RBC ANTIBODY SCREEN: CPT

## 2024-10-14 PROCEDURE — 86901 BLOOD TYPING SEROLOGIC RH(D): CPT

## 2024-10-14 PROCEDURE — 6360000002 HC RX W HCPCS: Performed by: OBSTETRICS & GYNECOLOGY

## 2024-10-14 PROCEDURE — 86780 TREPONEMA PALLIDUM: CPT

## 2024-10-14 PROCEDURE — 85027 COMPLETE CBC AUTOMATED: CPT

## 2024-10-14 PROCEDURE — 7210000100 HC LABOR FEE PER 1 HR: Performed by: OBSTETRICS & GYNECOLOGY

## 2024-10-14 PROCEDURE — 86900 BLOOD TYPING SEROLOGIC ABO: CPT

## 2024-10-14 PROCEDURE — 3700000156 HC EPIDURAL ANESTHESIA: Performed by: ANESTHESIOLOGY

## 2024-10-14 PROCEDURE — 94761 N-INVAS EAR/PLS OXIMETRY MLT: CPT

## 2024-10-14 RX ORDER — TRANEXAMIC ACID 10 MG/ML
1000 INJECTION, SOLUTION INTRAVENOUS
Status: COMPLETED | OUTPATIENT
Start: 2024-10-14 | End: 2024-10-14

## 2024-10-14 RX ORDER — SODIUM CHLORIDE, SODIUM LACTATE, POTASSIUM CHLORIDE, CALCIUM CHLORIDE 600; 310; 30; 20 MG/100ML; MG/100ML; MG/100ML; MG/100ML
INJECTION, SOLUTION INTRAVENOUS CONTINUOUS
Status: DISCONTINUED | OUTPATIENT
Start: 2024-10-14 | End: 2024-10-16 | Stop reason: HOSPADM

## 2024-10-14 RX ORDER — SODIUM CHLORIDE, SODIUM LACTATE, POTASSIUM CHLORIDE, AND CALCIUM CHLORIDE .6; .31; .03; .02 G/100ML; G/100ML; G/100ML; G/100ML
500 INJECTION, SOLUTION INTRAVENOUS PRN
Status: DISCONTINUED | OUTPATIENT
Start: 2024-10-14 | End: 2024-10-14

## 2024-10-14 RX ORDER — LANOLIN/MINERAL OIL
LOTION (ML) TOPICAL PRN
Status: DISCONTINUED | OUTPATIENT
Start: 2024-10-14 | End: 2024-10-16 | Stop reason: HOSPADM

## 2024-10-14 RX ORDER — SODIUM CHLORIDE 0.9 % (FLUSH) 0.9 %
5-40 SYRINGE (ML) INJECTION PRN
Status: DISCONTINUED | OUTPATIENT
Start: 2024-10-14 | End: 2024-10-16 | Stop reason: HOSPADM

## 2024-10-14 RX ORDER — NALOXONE HYDROCHLORIDE 0.4 MG/ML
INJECTION, SOLUTION INTRAMUSCULAR; INTRAVENOUS; SUBCUTANEOUS PRN
Status: DISCONTINUED | OUTPATIENT
Start: 2024-10-14 | End: 2024-10-14

## 2024-10-14 RX ORDER — BUPIVACAINE HYDROCHLORIDE 2.5 MG/ML
INJECTION, SOLUTION EPIDURAL; INFILTRATION; INTRACAUDAL
Status: DISCONTINUED | OUTPATIENT
Start: 2024-10-14 | End: 2024-10-14 | Stop reason: SDUPTHER

## 2024-10-14 RX ORDER — ONDANSETRON 2 MG/ML
4 INJECTION INTRAMUSCULAR; INTRAVENOUS EVERY 6 HOURS PRN
Status: DISCONTINUED | OUTPATIENT
Start: 2024-10-14 | End: 2024-10-14

## 2024-10-14 RX ORDER — IBUPROFEN 800 MG/1
800 TABLET, FILM COATED ORAL EVERY 8 HOURS SCHEDULED
Status: DISCONTINUED | OUTPATIENT
Start: 2024-10-14 | End: 2024-10-16 | Stop reason: HOSPADM

## 2024-10-14 RX ORDER — SODIUM CHLORIDE 9 MG/ML
INJECTION, SOLUTION INTRAVENOUS PRN
Status: DISCONTINUED | OUTPATIENT
Start: 2024-10-14 | End: 2024-10-16 | Stop reason: HOSPADM

## 2024-10-14 RX ORDER — MISOPROSTOL 200 UG/1
400 TABLET ORAL PRN
Status: DISCONTINUED | OUTPATIENT
Start: 2024-10-14 | End: 2024-10-16 | Stop reason: HOSPADM

## 2024-10-14 RX ORDER — DOCUSATE SODIUM 100 MG/1
100 CAPSULE, LIQUID FILLED ORAL 2 TIMES DAILY
Status: DISCONTINUED | OUTPATIENT
Start: 2024-10-14 | End: 2024-10-16 | Stop reason: HOSPADM

## 2024-10-14 RX ORDER — LIDOCAINE HYDROCHLORIDE AND EPINEPHRINE 15; 5 MG/ML; UG/ML
INJECTION, SOLUTION EPIDURAL
Status: DISCONTINUED | OUTPATIENT
Start: 2024-10-14 | End: 2024-10-14 | Stop reason: SDUPTHER

## 2024-10-14 RX ORDER — IBUPROFEN 800 MG/1
800 TABLET, FILM COATED ORAL EVERY 8 HOURS SCHEDULED
Status: DISCONTINUED | OUTPATIENT
Start: 2024-10-14 | End: 2024-10-14

## 2024-10-14 RX ORDER — TRANEXAMIC ACID 10 MG/ML
INJECTION, SOLUTION INTRAVENOUS
Status: DISCONTINUED
Start: 2024-10-14 | End: 2024-10-14

## 2024-10-14 RX ORDER — MISOPROSTOL 200 UG/1
400 TABLET ORAL PRN
Status: DISCONTINUED | OUTPATIENT
Start: 2024-10-14 | End: 2024-10-14

## 2024-10-14 RX ORDER — CARBOPROST TROMETHAMINE 250 UG/ML
250 INJECTION, SOLUTION INTRAMUSCULAR PRN
Status: DISCONTINUED | OUTPATIENT
Start: 2024-10-14 | End: 2024-10-14

## 2024-10-14 RX ORDER — ONDANSETRON 4 MG/1
4 TABLET, ORALLY DISINTEGRATING ORAL EVERY 6 HOURS PRN
Status: DISCONTINUED | OUTPATIENT
Start: 2024-10-14 | End: 2024-10-14

## 2024-10-14 RX ORDER — ONDANSETRON 2 MG/ML
4 INJECTION INTRAMUSCULAR; INTRAVENOUS EVERY 6 HOURS PRN
Status: DISCONTINUED | OUTPATIENT
Start: 2024-10-14 | End: 2024-10-16 | Stop reason: HOSPADM

## 2024-10-14 RX ORDER — SIMETHICONE 80 MG
80 TABLET,CHEWABLE ORAL EVERY 6 HOURS PRN
Status: DISCONTINUED | OUTPATIENT
Start: 2024-10-14 | End: 2024-10-16 | Stop reason: HOSPADM

## 2024-10-14 RX ORDER — METHYLERGONOVINE MALEATE 0.2 MG/ML
200 INJECTION INTRAVENOUS PRN
Status: DISCONTINUED | OUTPATIENT
Start: 2024-10-14 | End: 2024-10-14

## 2024-10-14 RX ORDER — ONDANSETRON 4 MG/1
4 TABLET, ORALLY DISINTEGRATING ORAL EVERY 6 HOURS PRN
Status: DISCONTINUED | OUTPATIENT
Start: 2024-10-14 | End: 2024-10-16 | Stop reason: HOSPADM

## 2024-10-14 RX ORDER — SODIUM CHLORIDE, SODIUM LACTATE, POTASSIUM CHLORIDE, CALCIUM CHLORIDE 600; 310; 30; 20 MG/100ML; MG/100ML; MG/100ML; MG/100ML
INJECTION, SOLUTION INTRAVENOUS CONTINUOUS
Status: DISCONTINUED | OUTPATIENT
Start: 2024-10-14 | End: 2024-10-14

## 2024-10-14 RX ORDER — FENTANYL/BUPIVACAINE/NS/PF 2-1250MCG
10 PLASTIC BAG, INJECTION (ML) INJECTION CONTINUOUS
Status: DISCONTINUED | OUTPATIENT
Start: 2024-10-14 | End: 2024-10-14

## 2024-10-14 RX ORDER — OXYCODONE HYDROCHLORIDE 5 MG/1
5 TABLET ORAL EVERY 4 HOURS PRN
Status: DISCONTINUED | OUTPATIENT
Start: 2024-10-14 | End: 2024-10-16 | Stop reason: HOSPADM

## 2024-10-14 RX ORDER — SODIUM CHLORIDE 0.9 % (FLUSH) 0.9 %
5-40 SYRINGE (ML) INJECTION EVERY 12 HOURS SCHEDULED
Status: DISCONTINUED | OUTPATIENT
Start: 2024-10-14 | End: 2024-10-16 | Stop reason: HOSPADM

## 2024-10-14 RX ORDER — ACETAMINOPHEN 500 MG
1000 TABLET ORAL EVERY 8 HOURS SCHEDULED
Status: DISCONTINUED | OUTPATIENT
Start: 2024-10-14 | End: 2024-10-16 | Stop reason: HOSPADM

## 2024-10-14 RX ADMIN — IBUPROFEN 800 MG: 800 TABLET, FILM COATED ORAL at 19:43

## 2024-10-14 RX ADMIN — PENICILLIN G POTASSIUM 5 MILLION UNITS: 5000000 INJECTION, POWDER, FOR SOLUTION INTRAMUSCULAR; INTRAVENOUS at 07:18

## 2024-10-14 RX ADMIN — Medication 10 ML/HR: at 16:20

## 2024-10-14 RX ADMIN — SODIUM CHLORIDE 2.5 MILLION UNITS: 9 INJECTION, SOLUTION INTRAVENOUS at 15:18

## 2024-10-14 RX ADMIN — DOCUSATE SODIUM 100 MG: 100 CAPSULE, LIQUID FILLED ORAL at 22:18

## 2024-10-14 RX ADMIN — Medication 500 ML: at 17:06

## 2024-10-14 RX ADMIN — ACETAMINOPHEN 1000 MG: 500 TABLET ORAL at 22:18

## 2024-10-14 RX ADMIN — OXYTOCIN 1 MILLI-UNITS/MIN: 10 INJECTION, SOLUTION INTRAMUSCULAR; INTRAVENOUS at 16:49

## 2024-10-14 RX ADMIN — BUPIVACAINE HYDROCHLORIDE 10 ML: 2.5 INJECTION, SOLUTION EPIDURAL; INFILTRATION; INTRACAUDAL at 15:48

## 2024-10-14 RX ADMIN — LIDOCAINE HYDROCHLORIDE AND EPINEPHRINE 3 ML: 15; 5 INJECTION, SOLUTION EPIDURAL at 15:46

## 2024-10-14 RX ADMIN — TRANEXAMIC ACID 1000 MG: 10 INJECTION, SOLUTION INTRAVENOUS at 17:08

## 2024-10-14 RX ADMIN — SODIUM CHLORIDE 2.5 MILLION UNITS: 9 INJECTION, SOLUTION INTRAVENOUS at 11:05

## 2024-10-14 RX ADMIN — SODIUM CHLORIDE, POTASSIUM CHLORIDE, SODIUM LACTATE AND CALCIUM CHLORIDE: 600; 310; 30; 20 INJECTION, SOLUTION INTRAVENOUS at 07:07

## 2024-10-14 RX ADMIN — OXYTOCIN 2 MILLI-UNITS/MIN: 10 INJECTION, SOLUTION INTRAMUSCULAR; INTRAVENOUS at 07:56

## 2024-10-14 RX ADMIN — SODIUM CHLORIDE, POTASSIUM CHLORIDE, SODIUM LACTATE AND CALCIUM CHLORIDE: 600; 310; 30; 20 INJECTION, SOLUTION INTRAVENOUS at 15:17

## 2024-10-14 ASSESSMENT — PAIN SCALES - GENERAL
PAINLEVEL_OUTOF10: 5
PAINLEVEL_OUTOF10: 5

## 2024-10-14 ASSESSMENT — PAIN - FUNCTIONAL ASSESSMENT
PAIN_FUNCTIONAL_ASSESSMENT: ACTIVITIES ARE NOT PREVENTED
PAIN_FUNCTIONAL_ASSESSMENT: ACTIVITIES ARE NOT PREVENTED

## 2024-10-14 ASSESSMENT — PAIN DESCRIPTION - ORIENTATION
ORIENTATION: LOWER
ORIENTATION: ANTERIOR;LOWER

## 2024-10-14 ASSESSMENT — PAIN DESCRIPTION - LOCATION
LOCATION: ABDOMEN
LOCATION: ABDOMEN

## 2024-10-14 ASSESSMENT — PAIN DESCRIPTION - DESCRIPTORS
DESCRIPTORS: CRAMPING
DESCRIPTORS: ACHING;CRAMPING

## 2024-10-14 NOTE — ANESTHESIA PROCEDURE NOTES
Epidural Block    Patient location during procedure: OB  Start time: 10/14/2024 3:31 PM  End time: 10/14/2024 3:48 PM  Reason for block: labor epidural  Staffing  Performed: resident/CRNA   Anesthesiologist: Alfred Benavides DO  Resident/CRNA: Tripp Mann APRN - CRNA  Performed by: Tripp Mann APRN - CRNA  Authorized by: Tripp Mann APRN - CRNA    Epidural  Patient position: sitting  Prep: DuraPrep  Patient monitoring: continuous pulse ox and frequent blood pressure checks  Approach: midline  Location: L2-3  Injection technique: SUJATA air  Provider prep: mask and sterile gloves  Needle  Needle type: Tuohy   Needle gauge: 17 G  Needle length: 6 in  Needle insertion depth: 10 cm  Catheter type: multi-orifice  Catheter size: 20 G  Catheter at skin depth: 15 cm  Test dose: negativeCatheter Secured: tegaderm and tape  Assessment  Hemodynamics: stable  Attempts: 1  Outcomes: uncomplicated  Additional Notes  Risks and benefits of epidural discussed with patient prior to placement. Patient verbalized understanding of risks for bleeding, infection, PDPH, nerve damage, and misplaced epidural. Patient consented to proceed with procedure.    Preanesthetic Checklist  Completed: patient identified, IV checked, risks and benefits discussed, surgical/procedural consents, pre-op evaluation, timeout performed, anesthesia consent given, oxygen available, monitors applied/VS acknowledged, fire risk safety assessment completed and verbalized and blood product R/B/A discussed and consented

## 2024-10-14 NOTE — PROGRESS NOTES
1900  Bedside and Verbal shift change report given to Ha RN (oncoming nurse) by Carrington RN (offgoing nurse). Report included the following information Nurse Handoff Report, Intake/Output, MAR, Recent Results, and Med Rec Status.    1950  Mild range BP noted, Dr Negrete notified.  No interventions at this time.  Patient educated on the signs and symptoms of preeclampsia.  Patient verbalized understanding.  2005  TRANSFER - OUT REPORT:    Verbal report given to ZAIN RN on April Medlock  being transferred to MIU for routine progression of patient care       Report consisted of patient's Situation, Background, Assessment and   Recommendations(SBAR).     Information from the following report(s) Nurse Handoff Report, Intake/Output, MAR, Recent Results, and Med Rec Status was reviewed with the receiving nurse.           Lines:   Peripheral IV 10/14/24 Proximal;Right Forearm (Active)   Site Assessment Clean, dry & intact 10/14/24 1907   Line Status Capped 10/14/24 1907   Phlebitis Assessment No symptoms 10/14/24 0733   Infiltration Assessment 0 10/14/24 0733   Dressing Status Clean, dry & intact 10/14/24 0733   Dressing Type Transparent 10/14/24 0733        Opportunity for questions and clarification was provided.      Patient transported with:  Registered Nurse

## 2024-10-14 NOTE — PROGRESS NOTES
Labor Progress Note    Patient seen, fetal heart rate and contraction pattern evaluated, patient examined.      Patient Vitals for the past 2 hrs:   BP Temp Temp src Pulse Resp SpO2   10/14/24 1103 -- -- -- -- -- 98 %   10/14/24 1100 130/85 98.1 °F (36.7 °C) Oral 98 18 98 %   10/14/24 1058 (!) 144/80 -- -- 93 -- 98 %       Physical Exam:  Cervical Exam:   3.5  cm dilated    50% effaced    -2 station    Presenting Part: cephalic  Uterine Activity: Frequency: Every 3 minutes  Pit 10  Fetal Heart Rate: Baseline: 130 per minute  Variability: moderate  Accelerations: no  Decelerations: none  Uterine contractions: regular, every 3 minutes    Assessment/Plan:  Reassuring fetal status  Continue Pitocin  AROM  Cat 1 FHT    Vince Negrete MD

## 2024-10-14 NOTE — PROGRESS NOTES
Labor Progress Note    Patient seen, fetal heart rate and contraction pattern evaluated, patient examined.  Epidural helping. Feels vaginal pressure.    Patient Vitals for the past 2 hrs:   BP Temp Temp src Pulse Resp SpO2   10/14/24 1640 -- -- -- -- -- 100 %   10/14/24 1638 134/77 -- -- 81 -- --   10/14/24 1635 121/72 -- -- 96 -- 97 %   10/14/24 1633 -- -- -- 86 -- 90 %   10/14/24 1632 134/73 -- -- 85 -- --   10/14/24 1630 -- -- -- -- -- 100 %       Physical Exam:  Cervical Exam:  9 cm dilated    100% effaced    0 station    Presenting Part: cephalic  Uterine Activity: Frequency: Every 2 minutes  Pit Off  Fetal Heart Rate: Baseline: 130 per minute  Variability: moderate  Accelerations: No  Decelerations: variable, but resolved with position change    Assessment/Plan:  Reassuring fetal status, Continue plan for vaginal delivery  Cat 1 FHT  Anticipate     Vince Negrete MD

## 2024-10-14 NOTE — PLAN OF CARE
Problem: Vaginal Birth or  Section  Goal: Fetal and maternal status remain reassuring during the birth process  Description:  Birth OB-Pregnancy care plan goal which identifies if the fetal and maternal status remain reassuring during the birth process  Outcome: Completed     Problem: Pain  Goal: Verbalizes/displays adequate comfort level or baseline comfort level  Outcome: Progressing     Problem: Safety - Adult  Goal: Free from fall injury  Outcome: Progressing

## 2024-10-14 NOTE — PROGRESS NOTES
1618 This RN performed SVE. 6-7/70/0. Pitocin turned off.     1620 This RN informed MD Penelope of EFM tracing and MARIAH. MD to review tracing. VORB for this RN to titrate pitocin 1-2mL/hr.  No further orders given at this time.

## 2024-10-14 NOTE — PROGRESS NOTES
0700: This RN assumed care of patient.   1530: Anesthesia at bedside for epidural.    Timeout-1539   Test dose- 1546   Loading Dose-1548  1900: Bedside and Verbal shift change report given to Sylvia RN (oncoming nurse) by Anjana ARIAS and Marian RN (offgoing nurse). Report included the following information Nurse Handoff Report, Adult Overview, Intake/Output, MAR, Recent Results, and Med Rec Status.     Pt seen in office today. He continues to smoke 17 cigs/day. Pt remains on tobacco cessation medication of 21 mg nicotine patch QD and 4 mg nicotine lozenge PRN (1-2 per hour in place of cigarettes). No adverse effects/mental changes noted at this time. Pt asked to reduce current smoking rate by 4 cigs/day. Reviewed coping strategies/habitual behavior/relapse prevention with patient. Exhaled carbon monoxide level was 21 ppm per Smokerlyzer (0-6 non-smoker). Will see pt back in office in 1 wk.

## 2024-10-14 NOTE — PROGRESS NOTES
1341- MD Negrete made aware pts blood pressure is 143/85 and pts systolics have been in the 130's but have just recently gone into the 140's. Pt is not symptomatic, but is in pain. No orders from  MD at this time     1400- Bedside shift change report given to Carrington RN (oncoming nurse) by Mary RN (offgoing nurse). Report included the following information Nurse Handoff Report, Adult Overview, Intake/Output, MAR, and Recent Results.

## 2024-10-14 NOTE — H&P
History & Physical    Name: Hilaria Livingston MRN: [id number]  SSN: [id number]    YOB: 1985  Age: 39 y.o.  Sex: female      Subjective:     Estimated Date of Delivery: 10/14/24  OB History          14    Para   7    Term   7            AB   4    Living   7         SAB   3    IAB   1    Ectopic        Molar        Multiple        Live Births   7                Ms. Livingston is admitted with pregnancy at 40w0d for induction of labor due to favorable cervix at term and maternal desire.     Prenatal course with Dr. Negrete is significant for:   Grandmultiparity: Will give prophylaxis with TXA at delivery to prevent PPH  AMA: NIPT Neg, XX  Hx of Anxiety/Depression: Stable. No meds at this time.   GBS Positive: NKDA, PCN G in labor.  Please see prenatal records for details.    Patient is here for her scheduled IOL.    Past Medical History:   Diagnosis Date    Abnormal Pap smear     follow up normal    Abnormal Papanicolaou smear of cervix     resolved    Asthma     uses inhaler PRN    Genital herpes     Gestational hypertension w/o significant proteinuria in 3rd trimester 2022    Herpes simplex virus (HSV) infection      History reviewed. No pertinent surgical history.  Social History     Occupational History    Not on file   Tobacco Use    Smoking status: Never    Smokeless tobacco: Never   Substance and Sexual Activity    Alcohol use: No    Drug use: No    Sexual activity: Not on file     History reviewed. No pertinent family history.    Allergies   Allergen Reactions    Shellfish Allergy Anaphylaxis     Prior to Admission medications    Medication Sig Start Date End Date Taking? Authorizing Provider   ibuprofen (ADVIL;MOTRIN) 600 MG tablet Take by mouth every 6 hours as needed  Patient not taking: Reported on 10/14/2024 7/10/22   Automatic Reconciliation, Ar        Review of Systems: A comprehensive review of systems was negative.    Objective:     Vitals:  Vitals:    10/14/24 0908 10/14/24

## 2024-10-14 NOTE — ANESTHESIA PRE PROCEDURE
Department of Anesthesiology  Preprocedure Note       Name:  Hilaria Livingston   Age:  39 y.o.  :  1985                                          MRN:  [id number]         Date:  10/14/2024      Surgeon: * No surgeons listed *    Procedure: * No procedures listed *    Medications prior to admission:   Prior to Admission medications    Medication Sig Start Date End Date Taking? Authorizing Provider   ibuprofen (ADVIL;MOTRIN) 600 MG tablet Take by mouth every 6 hours as needed  Patient not taking: Reported on 10/14/2024 7/10/22   Automatic Reconciliation, Ar       Current medications:    Current Facility-Administered Medications   Medication Dose Route Frequency Provider Last Rate Last Admin   • lactated ringers IV soln infusion   IntraVENous Continuous Vince Negrete  mL/hr at 10/14/24 1517 New Bag at 10/14/24 1517   • lactated ringers bolus 500 mL  500 mL IntraVENous PRN Vince Negrete MD        Or   • lactated ringers bolus 500 mL  500 mL IntraVENous PRN Vince Negrete MD       • methylergonovine (METHERGINE) injection 200 mcg  200 mcg IntraMUSCular PRN Vince Negrete MD       • carboprost (HEMABATE) injection 250 mcg  250 mcg IntraMUSCular PRN Vince Negrete MD       • miSOPROStol (CYTOTEC) tablet 400 mcg  400 mcg Buccal PRN Vince Negrete MD       • tranexamic acid-NaCl IVPB premix 1,000 mg  1,000 mg IntraVENous Once PRN Vince Negrete MD       • oxytocin (PITOCIN) 30 units in 500 mL infusion  87.3 diane-units/min IntraVENous Continuous PRN Vince Negrete MD        And   • oxytocin (PITOCIN) 10 unit bolus from the bag  10 Units IntraVENous PRN Vince Negrete MD       • ondansetron (ZOFRAN) injection 4 mg  4 mg IntraVENous Q6H PRN Vince Negrete MD        Or   • ondansetron (ZOFRAN-ODT) disintegrating tablet 4 mg  4 mg Oral Q6H PRN Vince Negrete MD       • penicillin G potassium 2.5 million units in 0.9% sodium chloride 100 mL IVPB

## 2024-10-14 NOTE — DISCHARGE SUMMARY
Patient ID:  Hilaria Livingston  [id number]  39 y.o.  1985    Admit Date: 10/14/2024    Discharge Date: 10/16/2024     Admitting Physician: Vince Negrete MD  Attending Physician: Vince Negrete MD    Admission Diagnoses:   Pregnant at 40.0 weeks  AMA; Grandmultiparity    Procedures for this admission: Epidural;     Hospital Course: None    Discharge Diagnoses: Same as above with  producing a viable infant.  Information for the patient's :  Oanh Livingston April [[id number]]          Discharge Disposition:  Home    Discharge Condition:  Good    Additional Diagnoses: advanced maternal age.     Maternal Labs: No components found for: \"OBEXTABORH\", \"OBEXTABSCRN\", \"OBEXTHBSAG\", \"OBEXTHIV\", \"OBEXTRUBELLA\", \"OBEXTRPR\", \"OBEXTGRBS\"    Cord Blood Results:   Information for the patient's :  Oanh Livingston April [[id number]]   No results found for: \"ABORH\", \"PCTDIG\", \"BILI\"        History of Present Illness:   OB History          14    Para   8    Term   8            AB   4    Living   8         SAB   3    IAB   1    Ectopic        Molar        Multiple   0    Live Births   8              Admitted for induction of labor.     Hospital Course:   Patient was admitted as above and delivered via .  Please the chart for details.  The postpartum course was remarkable for Gest HTN requiring treatment with Nifedipine. .  She was deemed stable for discharge home on day 2.    Follow up with Dr. Vince Negrete MD in 6 weeks        Signed:  Vince Negrete MD  10/14/2024  5:59 PM

## 2024-10-15 LAB — T PALLIDUM AB SER QL IA: NON REACTIVE

## 2024-10-15 PROCEDURE — 4A1HXCZ MONITORING OF PRODUCTS OF CONCEPTION, CARDIAC RATE, EXTERNAL APPROACH: ICD-10-PCS | Performed by: OBSTETRICS & GYNECOLOGY

## 2024-10-15 PROCEDURE — 6370000000 HC RX 637 (ALT 250 FOR IP): Performed by: OBSTETRICS & GYNECOLOGY

## 2024-10-15 PROCEDURE — 10907ZC DRAINAGE OF AMNIOTIC FLUID, THERAPEUTIC FROM PRODUCTS OF CONCEPTION, VIA NATURAL OR ARTIFICIAL OPENING: ICD-10-PCS | Performed by: OBSTETRICS & GYNECOLOGY

## 2024-10-15 PROCEDURE — 1120000000 HC RM PRIVATE OB

## 2024-10-15 PROCEDURE — 94761 N-INVAS EAR/PLS OXIMETRY MLT: CPT

## 2024-10-15 RX ORDER — NIFEDIPINE 30 MG/1
30 TABLET, EXTENDED RELEASE ORAL DAILY
Status: DISCONTINUED | OUTPATIENT
Start: 2024-10-15 | End: 2024-10-16 | Stop reason: HOSPADM

## 2024-10-15 RX ADMIN — IBUPROFEN 800 MG: 800 TABLET, FILM COATED ORAL at 13:08

## 2024-10-15 RX ADMIN — DOCUSATE SODIUM 100 MG: 100 CAPSULE, LIQUID FILLED ORAL at 09:33

## 2024-10-15 RX ADMIN — NIFEDIPINE 30 MG: 30 TABLET, FILM COATED, EXTENDED RELEASE ORAL at 09:33

## 2024-10-15 RX ADMIN — OXYCODONE 5 MG: 5 TABLET ORAL at 09:33

## 2024-10-15 RX ADMIN — ACETAMINOPHEN 1000 MG: 500 TABLET ORAL at 06:04

## 2024-10-15 RX ADMIN — DOCUSATE SODIUM 100 MG: 100 CAPSULE, LIQUID FILLED ORAL at 21:28

## 2024-10-15 RX ADMIN — ACETAMINOPHEN 1000 MG: 500 TABLET ORAL at 15:24

## 2024-10-15 RX ADMIN — IBUPROFEN 800 MG: 800 TABLET, FILM COATED ORAL at 04:45

## 2024-10-15 RX ADMIN — IBUPROFEN 800 MG: 800 TABLET, FILM COATED ORAL at 21:28

## 2024-10-15 ASSESSMENT — PAIN DESCRIPTION - DESCRIPTORS
DESCRIPTORS: CRAMPING

## 2024-10-15 ASSESSMENT — PAIN SCALES - GENERAL
PAINLEVEL_OUTOF10: 5

## 2024-10-15 ASSESSMENT — PAIN DESCRIPTION - LOCATION
LOCATION: ABDOMEN

## 2024-10-15 ASSESSMENT — PAIN - FUNCTIONAL ASSESSMENT
PAIN_FUNCTIONAL_ASSESSMENT: ACTIVITIES ARE NOT PREVENTED

## 2024-10-15 ASSESSMENT — PAIN DESCRIPTION - ORIENTATION
ORIENTATION: LOWER;ANTERIOR
ORIENTATION: LOWER;ANTERIOR
ORIENTATION: LOWER

## 2024-10-15 NOTE — PROGRESS NOTES
Post-Partum Day Number 1 Progress Note    April Yara   39 y.o.      Information for the patient's :  Yara, Female April [[id number]]   Vaginal, Spontaneous     Patient doing well without significant complaint.  Voiding without difficulty, normal lochia. Pain controlled. Breast feeding.    Vitals:  BP (!) 136/90 Comment: parameters to notify MD: >/ 160/110  Pulse 79   Temp 97.9 °F (36.6 °C) (Oral)   Resp 18   Ht 1.753 m (5' 9\")   Wt 102.5 kg (226 lb)   SpO2 100%   Breastfeeding Unknown   BMI 33.37 kg/m²   Temp (24hrs), Av.2 °F (36.8 °C), Min:97.9 °F (36.6 °C), Max:98.7 °F (37.1 °C)      Exam:   Patient without distress.                FF @ U-2 NT                LE NT w/ 1+ edema    Labs:     Lab Results   Component Value Date/Time    WBC 7.6 10/14/2024 06:58 AM    HGB 9.5 10/14/2024 06:58 AM    HCT 29.9 10/14/2024 06:58 AM     10/14/2024 06:58 AM       No results found for this or any previous visit (from the past 24 hour(s)).      Assessment: PPD 1 s/p , stable; O+/RI/XX \"Carter\"    Plan:  1. Continue routine postpartum care  2. Gest HTN: Start Nifedipine 30mg XL qd      Vince Negrete MD  10/15/2024

## 2024-10-15 NOTE — LACTATION NOTE
This note was copied from a baby's chart.  This is an experienced breastfeeding mother and her 4th baby to breastfeed.  She BF her last two children for 2 years. Mother states she is feeding on demand, to early cues of hunger and baby is latching well without discomfort.  Hand pump to bedside per pt request and mother measured for flange sizes.        Discussed with mother her plan for feeding.  Reviewed the benefits of exclusive breast milk feeding during the hospital stay.   Informed her of the risks of using formula to supplement in the first few days of life as well as the benefits of successful breast milk feeding; referred her to the Breastfeeding booklet about this information.   She acknowledges understanding of information reviewed and states that it is her plan to breastfeed her infant.  Will support her choice and offer additional information as needed.     Engorgement Care Guidelines:  Reviewed how milk is made and normal phases of milk production.  Taught care of engorged breasts - physiologic breastfeeding encouraged with use of cool packs (no ice directly on skin). Consider use of NSAIDS where appropriate for discomfort and inflammation. Can employ light touch, lymphatic drainage techniques on tender grandular tissues. Anticipatory guidance shared.    Reviewed breastfeeding basics:  How milk is made and normal  breastfeeding behaviors discussed.  Supply and demand,  stomach size, early feeding cues, skin to skin bonding with comfortable positioning and baby led latch-on reviewed.  How to identify signs of successful breastfeeding sessions reviewed; education on asymetrical latch, signs of effective latching vs shallow, in-effective latching, normal  feeding frequency and duration and expected infant output discussed.  Normal course of breastfeeding discussed including the AAP's recommendation that children receive exclusive breast milk feedings for the first six months of life

## 2024-10-15 NOTE — PROGRESS NOTES
2105: Dr. Negrete called regarding pts BP: 132/90 upon admission. Per Dr. Negrete, call if severe Bps of </ 160/110.

## 2024-10-15 NOTE — L&D DELIVERY NOTE
Yes       Blood Loss  Mother: Yara, April #[id number]     Start of Mother's Information      Delivery Blood Loss   Intrapartum & Postpartum: 10/14/24 0756 - 10/15/24 0822    Delivery Admission: 10/14/24 0614 - 10/15/24 0822         Intrapartum & Postpartum Delivery Admission    Quantitative Blood Loss (mL) Hospital Encounter 250 grams 250 grams    Total  250 mL 250 mL               End of Mother's Information  Mother: Yara, April #[id number]                Delivery Providers    Delivering clinician: Vince Negrete MD     Provider Role    Vince Negrete MD Obstetrician    Marian Shultz RN Primary Nurse    Aminata Clarke RN Primary [place] Nurse    Eren, Myranda, RN Nursery Nurse    Anjana Parry RN Registered Nurse    Angélica Rivera RN Charge Nurse              [place] Assessment    Living Status: Living  Delivery Location Comment: 203        Skin Color:   Heart Rate:   Reflex Irritability:   Muscle Tone:   Respiratory Effort:   Total:            1 Minute:    0    2    2    2    2    8         5 Minute:    1    2    2    2    2    9                                        Apgars Assigned By: NIKKY CLARKE RN              Resuscitation    Method: Bulb Suction, Stimulation, Suctioning             [place] Measurements      Birth Weight: 3740 g   Birth Length: 52.1 cm     Head Circumference: 35.5 cm     Chest Circumference: 35.5 cm     Abdominal Girth: 33.8 cm

## 2024-10-16 VITALS
HEIGHT: 69 IN | WEIGHT: 226 LBS | OXYGEN SATURATION: 100 % | BODY MASS INDEX: 33.47 KG/M2 | DIASTOLIC BLOOD PRESSURE: 80 MMHG | TEMPERATURE: 98.4 F | HEART RATE: 80 BPM | SYSTOLIC BLOOD PRESSURE: 126 MMHG | RESPIRATION RATE: 16 BRPM

## 2024-10-16 PROBLEM — O09.523 AMA (ADVANCED MATERNAL AGE) MULTIGRAVIDA 35+, THIRD TRIMESTER: Status: RESOLVED | Noted: 2022-07-07 | Resolved: 2024-10-16

## 2024-10-16 PROCEDURE — 94761 N-INVAS EAR/PLS OXIMETRY MLT: CPT

## 2024-10-16 PROCEDURE — 6370000000 HC RX 637 (ALT 250 FOR IP): Performed by: OBSTETRICS & GYNECOLOGY

## 2024-10-16 RX ORDER — IBUPROFEN 800 MG/1
800 TABLET, FILM COATED ORAL EVERY 8 HOURS SCHEDULED
Qty: 60 TABLET | Refills: 1 | Status: SHIPPED | OUTPATIENT
Start: 2024-10-16

## 2024-10-16 RX ORDER — NIFEDIPINE 30 MG/1
30 TABLET, EXTENDED RELEASE ORAL DAILY
Qty: 30 TABLET | Refills: 1 | Status: SHIPPED | OUTPATIENT
Start: 2024-10-16

## 2024-10-16 RX ADMIN — IBUPROFEN 800 MG: 800 TABLET, FILM COATED ORAL at 06:35

## 2024-10-16 RX ADMIN — DOCUSATE SODIUM 100 MG: 100 CAPSULE, LIQUID FILLED ORAL at 09:22

## 2024-10-16 RX ADMIN — NIFEDIPINE 30 MG: 30 TABLET, FILM COATED, EXTENDED RELEASE ORAL at 09:22

## 2024-10-16 RX ADMIN — ACETAMINOPHEN 1000 MG: 500 TABLET ORAL at 00:30

## 2024-10-16 ASSESSMENT — PAIN DESCRIPTION - ORIENTATION
ORIENTATION: LOWER
ORIENTATION: LOWER

## 2024-10-16 ASSESSMENT — PAIN SCALES - GENERAL
PAINLEVEL_OUTOF10: 5
PAINLEVEL_OUTOF10: 4

## 2024-10-16 ASSESSMENT — PAIN DESCRIPTION - LOCATION
LOCATION: ABDOMEN
LOCATION: ABDOMEN

## 2024-10-16 ASSESSMENT — PAIN DESCRIPTION - DESCRIPTORS
DESCRIPTORS: CRAMPING
DESCRIPTORS: CRAMPING

## 2024-10-16 NOTE — LACTATION NOTE
This note was copied from a baby's chart.  Mother resting in bed with baby swaddled sucking on paci.  Mother states last night she had a hard time with baby being fussy at breast. Mother asking about the possibility of the pacifier causing issues with baby feeding at breast.  Reviewed normal  feeding behaviors.  Reviewed AAP guidellines for no paci, bottle, or pumping for first month of life in order to ensure ample milk supply.  Encouraged mother to offer breast at any feeding cue as this encourages her body to make milk that will meet the needs of her baby.  Tips shared.  Discharge info reviewed.      Chart shows numerous feedings, void, stool WNL.  Discussed importance of monitoring outputs and feedings on first week of life.  Discussed ways to tell if baby is  getting enough breast milk, ie  voids and stools, change in color of stool, and return to birth wt within 2 weeks.  Follow up with pediatrician visit for weight check in 1-2 days (per AAP guidelines.)  Encouraged to call Warm Line  [phone]  for any questions/problems that arise. Mother also given breastfeeding support group dates and times for any future needs    Engorgement Care Guidelines:  Reviewed how milk is made and normal phases of milk production.  Taught care of engorged breasts - physiologic breastfeeding encouraged with use of cool packs (no ice directly on skin). Consider use of NSAIDS where appropriate for discomfort and inflammation. Can employ light touch, lymphatic drainage techniques on tender grandular tissues. Anticipatory guidance shared.    Pt will successfully establish breastfeeding by feeding in response to early feeding cues   or wake every 3h, will obtain deep latch, and will keep log of feedings/output.  Taught to BF at hunger cues and or q 2-3 hrs and to offer 10-20 drops of hand expressed colostrum at any non-feeds.      Left Breast: Soft  Left Nipple: Protrude  Right Nipple: Protrude  Right Breast: Soft  Position and

## 2024-10-16 NOTE — DISCHARGE INSTRUCTIONS
condition that requires treatment.  If you have any of these signs, you may be depressed. See your doctor right away.    You feel very sad or hopeless and lose interest in daily activities.       You sleep too much or not enough.       You feel tired or as if you have no energy.       You eat too much or too little.       You write or talk about death.     Tips to help with postpartum depression        What you can do   Try to go to all of your counseling sessions.  Take medicines as directed.  Eat healthy foods.  Get daily exercise, such as walks.  Try to get some sunlight every day.  Avoid using alcohol or other substances.  Get as much rest as possible.  Connect with friends, and join a support group for new parents.  When should you call for help?   Call 045 if:    You feel you cannot stop from hurting yourself, your baby, or someone else.   Where to get help 24 hours a day, 7 days a week   If you or someone you know talks about suicide, self-harm, a mental health crisis, a substance use crisis, or any other kind of emotional distress, get help right away. You can:    Call the Suicide and Crisis Lifeline at 519.     Call 3-817-546-TALK (1-834.128.4837).     Text HOME to 905404 to access the Crisis Text Line.   Consider saving these numbers in your phone.  Go to [facility] for more information or to chat online.  Call your doctor now or seek immediate medical care if:    You are having trouble caring for yourself or your baby.     You hear voices.   Contact your doctor if:    You have problems with your medicines.     You do not get better as expected.   Follow-up care is a key part of your treatment and safety. Be sure to make and go to all appointments, and call your doctor if you are having problems. It's also a good idea to know your test results and keep a list of the medicines you take.  Where can you learn more?  Go to https://www.healthwise.net/patientEd and enter Y765 to learn more about \"Depression

## 2024-10-16 NOTE — PROGRESS NOTES
Pt was given discharge instructions and verbalized understanding. Pt states that she will make a 2 week appointment for her blood pressure check.Pt states that she knows what to look for if her blood pressure is increasing.  Pt states that she knows when to call the dr. Pt was discharged in stable condition.

## 2024-10-16 NOTE — PROGRESS NOTES
Post-Partum Day Number 2 Progress Note    Patient doing well post-partum without significant complaints.  Voiding without difficulty, normal lochia.    Vitals:  Patient Vitals for the past 24 hrs:   BP Temp Temp src Pulse Resp SpO2   10/16/24 0713 132/88 -- Oral 78 16 100 %   10/16/24 0638 (!) 127/93 -- -- 85 -- --   10/16/24 0636 (!) 127/91 98.1 °F (36.7 °C) Oral 84 16 100 %   10/16/24 0340 123/89 98.1 °F (36.7 °C) Oral 77 16 100 %   10/15/24 2355 128/83 -- Oral 85 16 100 %   10/15/24 1819 132/86 98.2 °F (36.8 °C) -- 85 20 100 %   10/15/24 1441 (!) 131/91 98.2 °F (36.8 °C) -- 96 16 98 %   10/15/24 0930 138/89 98.1 °F (36.7 °C) Oral 76 -- 100 %     Temp (24hrs), Av.1 °F (36.7 °C), Min:98.1 °F (36.7 °C), Max:98.2 °F (36.8 °C)      Vital signs stable, afebrile.    Exam:  Patient without distress.               Abdomen soft, fundus firm, nontender               Lower extremities, RODRIGUEZ nontender w/o edema    Labs:   No results found for this or any previous visit (from the past 24 hour(s)).    Assessment and Plan:  PPD 2, stable, routine care; O+/RI/XX  1. Discharge today-instructions reviewed.    2. Gest HTN: Continue Nifedipine 30mg XL qd